# Patient Record
Sex: MALE | Race: WHITE | NOT HISPANIC OR LATINO | URBAN - METROPOLITAN AREA
[De-identification: names, ages, dates, MRNs, and addresses within clinical notes are randomized per-mention and may not be internally consistent; named-entity substitution may affect disease eponyms.]

---

## 2020-10-04 ENCOUNTER — EMERGENCY (EMERGENCY)
Facility: HOSPITAL | Age: 25
LOS: 0 days | Discharge: HOME | End: 2020-10-04
Attending: EMERGENCY MEDICINE | Admitting: EMERGENCY MEDICINE
Payer: MEDICAID

## 2020-10-04 VITALS
RESPIRATION RATE: 18 BRPM | OXYGEN SATURATION: 98 % | HEART RATE: 89 BPM | SYSTOLIC BLOOD PRESSURE: 139 MMHG | DIASTOLIC BLOOD PRESSURE: 99 MMHG | TEMPERATURE: 98 F

## 2020-10-04 DIAGNOSIS — J45.901 UNSPECIFIED ASTHMA WITH (ACUTE) EXACERBATION: ICD-10-CM

## 2020-10-04 PROCEDURE — 99284 EMERGENCY DEPT VISIT MOD MDM: CPT

## 2020-10-04 RX ORDER — ALBUTEROL 90 UG/1
3 AEROSOL, METERED ORAL
Qty: 84 | Refills: 0
Start: 2020-10-04 | End: 2020-10-10

## 2020-10-04 RX ORDER — ALBUTEROL 90 UG/1
1 AEROSOL, METERED ORAL ONCE
Refills: 0 | Status: DISCONTINUED | OUTPATIENT
Start: 2020-10-04 | End: 2020-10-04

## 2020-10-04 RX ORDER — IPRATROPIUM/ALBUTEROL SULFATE 18-103MCG
3 AEROSOL WITH ADAPTER (GRAM) INHALATION ONCE
Refills: 0 | Status: COMPLETED | OUTPATIENT
Start: 2020-10-04 | End: 2020-10-04

## 2020-10-04 RX ADMIN — Medication 3 MILLILITER(S): at 10:00

## 2020-10-04 RX ADMIN — Medication 3 MILLILITER(S): at 10:30

## 2020-10-04 NOTE — ED PROVIDER NOTE - OBJECTIVE STATEMENT
26 y/o male with hx Asthma BIBA c/o "I've been feeling SOB and wheezing for a couple days. I ran out of my inhaler. I got a nebulizer treatment and Solumedrol on the ambulance." no fever/ chills/ recent travel

## 2020-10-04 NOTE — ED PROVIDER NOTE - ATTENDING CONTRIBUTION TO CARE
25 year old male, pmhx asthma, presenting with dyspnea and wheezing x 2-3 days. States he ran out of his inhaler and therefore has not been able to take anything at home. States it feels the exact same as prior asthma exacerbations. Otherwise denies fevers, chest pain, GI symptoms, leg swelling or other complaints.    Vital Signs: I have reviewed the initial vital signs.  Constitutional: NAD, well-nourished, appears stated age, no acute distress.  HEENT: Airway patent, moist MM, no erythema/swelling/deformity of oral structures. EOMI, PERRLA.  CV: regular rate, regular rhythm, well-perfused extremities, 2+ b/l DP and radial pulses equal.  Lungs: (+) mild b/l wheezing, no increased WOB.  ABD: NTND, no guarding or rebound, no pulsatile mass, no hernias.   MSK: Neck supple, nontender, nl ROM, no stepoff. Chest nontender. Back nontender in TLS spine or to b/l bony structures or flanks. Ext nontender, nl rom, no deformity.   INTEG: Skin warm, dry, no rash.  NEURO: A&Ox3, normal strength, nl sensation throughout, normal speech.   PSYCH: Calm, cooperative, normal affect and interaction.    Patient very well appearing. Will give nebs, re-eval. Patient received steroids IV en route.

## 2020-10-04 NOTE — ED PROVIDER NOTE - PATIENT PORTAL LINK FT
You can access the FollowMyHealth Patient Portal offered by [facility] by registering at the following website: [email or web address]. By joining [facility]’s FollowMyHealth portal, you will also be able to view your health information using other applications (apps) compatible with our system.

## 2020-10-04 NOTE — ED PROVIDER NOTE - CLINICAL SUMMARY MEDICAL DECISION MAKING FREE TEXT BOX
Patient presented with wheezing similar to prior asthma exacerbations. Otherwise afebrile, HD stable, speaking full sentences, normal O2 saturation on RA. Given steroids by EMS en route and in ED given nebs with resolution of wheezing. Patient able to ambulate without difficulty or desaturation after tx, tolerates PO. Will replenish patient's prescriptions for asthma and discharge with outpatient follow up. Patient agreeable with plan. Agrees to return to ED for any new or worsening symptoms.

## 2020-10-04 NOTE — ED ADULT TRIAGE NOTE - NS ED NURSE AMBULANCES
Attempted to get EKG, pt became agitated an uncooperative. Security called for assistance will attempt again when they arrive. Nallely

## 2020-10-04 NOTE — ED ADULT NURSE NOTE - OBJECTIVE STATEMENT
25 year old male presents for asthma exacerbation patient received solumedrol in the field, and neb tx

## 2020-11-08 ENCOUNTER — INPATIENT (INPATIENT)
Facility: HOSPITAL | Age: 25
LOS: 2 days | Discharge: HOME | End: 2020-11-11
Attending: INTERNAL MEDICINE | Admitting: INTERNAL MEDICINE
Payer: MEDICAID

## 2020-11-08 VITALS
OXYGEN SATURATION: 94 % | TEMPERATURE: 98 F | HEART RATE: 114 BPM | WEIGHT: 145.06 LBS | SYSTOLIC BLOOD PRESSURE: 131 MMHG | DIASTOLIC BLOOD PRESSURE: 90 MMHG | RESPIRATION RATE: 20 BRPM | HEIGHT: 69 IN

## 2020-11-08 PROBLEM — J45.909 UNSPECIFIED ASTHMA, UNCOMPLICATED: Chronic | Status: ACTIVE | Noted: 2020-10-04

## 2020-11-08 LAB
ALBUMIN SERPL ELPH-MCNC: 4.7 G/DL — SIGNIFICANT CHANGE UP (ref 3.5–5.2)
ALP SERPL-CCNC: 59 U/L — SIGNIFICANT CHANGE UP (ref 30–115)
ALT FLD-CCNC: 17 U/L — SIGNIFICANT CHANGE UP (ref 0–41)
ANION GAP SERPL CALC-SCNC: 13 MMOL/L — SIGNIFICANT CHANGE UP (ref 7–14)
AST SERPL-CCNC: 26 U/L — SIGNIFICANT CHANGE UP (ref 0–41)
BASE EXCESS BLDA CALC-SCNC: -1.3 MMOL/L — SIGNIFICANT CHANGE UP (ref -2–2)
BASE EXCESS BLDV CALC-SCNC: 0.4 MMOL/L — SIGNIFICANT CHANGE UP (ref -2–2)
BASOPHILS # BLD AUTO: 0.06 K/UL — SIGNIFICANT CHANGE UP (ref 0–0.2)
BASOPHILS NFR BLD AUTO: 0.4 % — SIGNIFICANT CHANGE UP (ref 0–1)
BILIRUB SERPL-MCNC: 0.3 MG/DL — SIGNIFICANT CHANGE UP (ref 0.2–1.2)
BUN SERPL-MCNC: 14 MG/DL — SIGNIFICANT CHANGE UP (ref 10–20)
CA-I SERPL-SCNC: 1.23 MMOL/L — SIGNIFICANT CHANGE UP (ref 1.12–1.3)
CALCIUM SERPL-MCNC: 9.3 MG/DL — SIGNIFICANT CHANGE UP (ref 8.5–10.1)
CHLORIDE SERPL-SCNC: 105 MMOL/L — SIGNIFICANT CHANGE UP (ref 98–110)
CO2 SERPL-SCNC: 23 MMOL/L — SIGNIFICANT CHANGE UP (ref 17–32)
CREAT SERPL-MCNC: 0.7 MG/DL — SIGNIFICANT CHANGE UP (ref 0.7–1.5)
EOSINOPHIL # BLD AUTO: 0.04 K/UL — SIGNIFICANT CHANGE UP (ref 0–0.7)
EOSINOPHIL NFR BLD AUTO: 0.3 % — SIGNIFICANT CHANGE UP (ref 0–8)
GAS PNL BLDV: 142 MMOL/L — SIGNIFICANT CHANGE UP (ref 136–145)
GAS PNL BLDV: SIGNIFICANT CHANGE UP
GLUCOSE SERPL-MCNC: 133 MG/DL — HIGH (ref 70–99)
HCO3 BLDA-SCNC: 25 MMOL/L — SIGNIFICANT CHANGE UP (ref 23–27)
HCO3 BLDV-SCNC: 27 MMOL/L — SIGNIFICANT CHANGE UP (ref 22–29)
HCT VFR BLD CALC: 47 % — SIGNIFICANT CHANGE UP (ref 42–52)
HCT VFR BLDA CALC: 49.4 % — HIGH (ref 34–44)
HGB BLD CALC-MCNC: 16.1 G/DL — SIGNIFICANT CHANGE UP (ref 14–18)
HGB BLD-MCNC: 15.3 G/DL — SIGNIFICANT CHANGE UP (ref 14–18)
IMM GRANULOCYTES NFR BLD AUTO: 0.4 % — HIGH (ref 0.1–0.3)
LACTATE BLDV-MCNC: 2.8 MMOL/L — HIGH (ref 0.5–1.6)
LACTATE SERPL-SCNC: 4.8 MMOL/L — CRITICAL HIGH (ref 0.7–2)
LYMPHOCYTES # BLD AUTO: 0.57 K/UL — LOW (ref 1.2–3.4)
LYMPHOCYTES # BLD AUTO: 3.9 % — LOW (ref 20.5–51.1)
MCHC RBC-ENTMCNC: 25.8 PG — LOW (ref 27–31)
MCHC RBC-ENTMCNC: 32.6 G/DL — SIGNIFICANT CHANGE UP (ref 32–37)
MCV RBC AUTO: 79.1 FL — LOW (ref 80–94)
MONOCYTES # BLD AUTO: 0.2 K/UL — SIGNIFICANT CHANGE UP (ref 0.1–0.6)
MONOCYTES NFR BLD AUTO: 1.4 % — LOW (ref 1.7–9.3)
NEUTROPHILS # BLD AUTO: 13.51 K/UL — HIGH (ref 1.4–6.5)
NEUTROPHILS NFR BLD AUTO: 93.6 % — HIGH (ref 42.2–75.2)
NRBC # BLD: 0 /100 WBCS — SIGNIFICANT CHANGE UP (ref 0–0)
PCO2 BLDA: 47 MMHG — HIGH (ref 38–42)
PCO2 BLDV: 48 MMHG — SIGNIFICANT CHANGE UP (ref 41–51)
PH BLDA: 7.33 — LOW (ref 7.38–7.42)
PH BLDV: 7.35 — SIGNIFICANT CHANGE UP (ref 7.26–7.43)
PLATELET # BLD AUTO: 193 K/UL — SIGNIFICANT CHANGE UP (ref 130–400)
PO2 BLDA: 24 MMHG — CRITICAL LOW (ref 78–95)
PO2 BLDV: 24 MMHG — SIGNIFICANT CHANGE UP (ref 20–40)
POTASSIUM BLDV-SCNC: 4 MMOL/L — SIGNIFICANT CHANGE UP (ref 3.3–5.6)
POTASSIUM SERPL-MCNC: 4 MMOL/L — SIGNIFICANT CHANGE UP (ref 3.5–5)
POTASSIUM SERPL-SCNC: 4 MMOL/L — SIGNIFICANT CHANGE UP (ref 3.5–5)
PROT SERPL-MCNC: 7.2 G/DL — SIGNIFICANT CHANGE UP (ref 6–8)
RBC # BLD: 5.94 M/UL — SIGNIFICANT CHANGE UP (ref 4.7–6.1)
RBC # FLD: 14 % — SIGNIFICANT CHANGE UP (ref 11.5–14.5)
SAO2 % BLDA: 48 % — CRITICAL LOW (ref 94–98)
SAO2 % BLDV: 48 % — SIGNIFICANT CHANGE UP
SODIUM SERPL-SCNC: 141 MMOL/L — SIGNIFICANT CHANGE UP (ref 135–146)
WBC # BLD: 14.44 K/UL — HIGH (ref 4.8–10.8)
WBC # FLD AUTO: 14.44 K/UL — HIGH (ref 4.8–10.8)

## 2020-11-08 PROCEDURE — 99285 EMERGENCY DEPT VISIT HI MDM: CPT

## 2020-11-08 PROCEDURE — 71045 X-RAY EXAM CHEST 1 VIEW: CPT | Mod: 26

## 2020-11-08 RX ORDER — ENOXAPARIN SODIUM 100 MG/ML
40 INJECTION SUBCUTANEOUS AT BEDTIME
Refills: 0 | Status: DISCONTINUED | OUTPATIENT
Start: 2020-11-08 | End: 2020-11-11

## 2020-11-08 RX ORDER — ALBUTEROL 90 UG/1
1 AEROSOL, METERED ORAL EVERY 4 HOURS
Refills: 0 | Status: DISCONTINUED | OUTPATIENT
Start: 2020-11-08 | End: 2020-11-11

## 2020-11-08 RX ORDER — SODIUM CHLORIDE 9 MG/ML
1000 INJECTION, SOLUTION INTRAVENOUS
Refills: 0 | Status: DISCONTINUED | OUTPATIENT
Start: 2020-11-08 | End: 2020-11-08

## 2020-11-08 RX ORDER — PANTOPRAZOLE SODIUM 20 MG/1
40 TABLET, DELAYED RELEASE ORAL
Refills: 0 | Status: DISCONTINUED | OUTPATIENT
Start: 2020-11-08 | End: 2020-11-11

## 2020-11-08 RX ORDER — CHLORHEXIDINE GLUCONATE 213 G/1000ML
1 SOLUTION TOPICAL
Refills: 0 | Status: DISCONTINUED | OUTPATIENT
Start: 2020-11-08 | End: 2020-11-11

## 2020-11-08 RX ORDER — IPRATROPIUM/ALBUTEROL SULFATE 18-103MCG
3 AEROSOL WITH ADAPTER (GRAM) INHALATION
Refills: 0 | Status: COMPLETED | OUTPATIENT
Start: 2020-11-08 | End: 2020-11-08

## 2020-11-08 RX ORDER — MAGNESIUM SULFATE 500 MG/ML
2 VIAL (ML) INJECTION ONCE
Refills: 0 | Status: COMPLETED | OUTPATIENT
Start: 2020-11-08 | End: 2020-11-08

## 2020-11-08 RX ORDER — SODIUM CHLORIDE 9 MG/ML
2000 INJECTION, SOLUTION INTRAVENOUS ONCE
Refills: 0 | Status: COMPLETED | OUTPATIENT
Start: 2020-11-08 | End: 2020-11-08

## 2020-11-08 RX ORDER — ACETAMINOPHEN 500 MG
650 TABLET ORAL EVERY 6 HOURS
Refills: 0 | Status: DISCONTINUED | OUTPATIENT
Start: 2020-11-08 | End: 2020-11-11

## 2020-11-08 RX ORDER — IPRATROPIUM/ALBUTEROL SULFATE 18-103MCG
3 AEROSOL WITH ADAPTER (GRAM) INHALATION
Refills: 0 | Status: DISCONTINUED | OUTPATIENT
Start: 2020-11-08 | End: 2020-11-09

## 2020-11-08 RX ORDER — IPRATROPIUM/ALBUTEROL SULFATE 18-103MCG
3 AEROSOL WITH ADAPTER (GRAM) INHALATION EVERY 4 HOURS
Refills: 0 | Status: DISCONTINUED | OUTPATIENT
Start: 2020-11-08 | End: 2020-11-08

## 2020-11-08 RX ADMIN — Medication 3 MILLILITER(S): at 14:50

## 2020-11-08 RX ADMIN — Medication 3 MILLILITER(S): at 14:00

## 2020-11-08 RX ADMIN — Medication 3 MILLILITER(S): at 16:00

## 2020-11-08 RX ADMIN — Medication 650 MILLIGRAM(S): at 15:43

## 2020-11-08 RX ADMIN — Medication 3 MILLILITER(S): at 22:29

## 2020-11-08 RX ADMIN — Medication 3 MILLILITER(S): at 02:24

## 2020-11-08 RX ADMIN — Medication 3 MILLILITER(S): at 13:00

## 2020-11-08 RX ADMIN — Medication 3 MILLILITER(S): at 21:00

## 2020-11-08 RX ADMIN — ENOXAPARIN SODIUM 40 MILLIGRAM(S): 100 INJECTION SUBCUTANEOUS at 21:21

## 2020-11-08 RX ADMIN — SODIUM CHLORIDE 2000 MILLILITER(S): 9 INJECTION, SOLUTION INTRAVENOUS at 06:05

## 2020-11-08 RX ADMIN — Medication 3 MILLILITER(S): at 03:11

## 2020-11-08 RX ADMIN — Medication 3 MILLILITER(S): at 20:00

## 2020-11-08 RX ADMIN — Medication 3 MILLILITER(S): at 05:40

## 2020-11-08 RX ADMIN — Medication 3 MILLILITER(S): at 06:05

## 2020-11-08 RX ADMIN — SODIUM CHLORIDE 80 MILLILITER(S): 9 INJECTION, SOLUTION INTRAVENOUS at 12:25

## 2020-11-08 RX ADMIN — Medication 3 MILLILITER(S): at 17:00

## 2020-11-08 RX ADMIN — Medication 3 MILLILITER(S): at 04:48

## 2020-11-08 RX ADMIN — Medication 3 MILLILITER(S): at 23:40

## 2020-11-08 RX ADMIN — Medication 80 MILLIGRAM(S): at 18:06

## 2020-11-08 RX ADMIN — Medication 50 GRAM(S): at 04:48

## 2020-11-08 RX ADMIN — Medication 3 MILLILITER(S): at 18:00

## 2020-11-08 RX ADMIN — Medication 3 MILLILITER(S): at 19:00

## 2020-11-08 NOTE — ED PROVIDER NOTE - CLINICAL SUMMARY MEDICAL DECISION MAKING FREE TEXT BOX
patient presented to ED for sob/wheezing, improved with nebs and steroids, but continued with sob/wheezing, results of the labs, imaging findings reviewed and discussed with patient, discussed with admitting physician and MAR, patient is admitted to Medicine for further evaluation and care.

## 2020-11-08 NOTE — PATIENT PROFILE ADULT - HARM RISK FACTORS
Patient Education        Childhood Depression: Care Instructions  Your Care Instructions  Depression is a mood disorder that causes a child or teen to feel sad or irritable for a long period of time. A young person who is depressed may not enjoy school, play, or friends. He or she may also sleep more or less than usual, lose or gain weight, and be withdrawn. Depression may run in families. It is linked to a chemical problem in the brain. The chemical problem can be caused by medicines, illness, or stress. Events that cause great stress, such as moving or the loss of a loved one, can trigger it. Depression can last for a long time. It may come in cycles of feeling down and feeling normal. It is important to know that all forms of depression can be treated. Follow-up care is a key part of your child's treatment and safety. Be sure to make and go to all appointments, and call your doctor if your child is having problems. It's also a good idea to know your child's test results and keep a list of the medicines your child takes. How can you care for your child at home? · Offer your child support and understanding. This is one of the most important things you can do to help your child cope with being depressed. · Be safe with medicines. Have your child take medicines exactly as prescribed. Call your doctor if your child has any problems with his or her medicine. It is important for your child to keep taking medicine for depression even after symptoms go away, so that it does not come back. Your child may need to try several medicines before finding the one that works best. Many side effects of the medicines go away after a while. Talk to your doctor about any side effects or other concerns. · Make sure your child gets enough sleep. There are things you can do if he or she has problems sleeping. ? Have your child go to bed at the same time every night and get up at the same time every morning. ?  Keep his or her bedroom dark and free of noise. ? Do not let your child drink anything with caffeine after 12:00 noon. ? Do not give your child over-the-counter sleeping pills. They can make his or her sleep restless. They may also interact with depression medicine. · Make sure your child gets regular exercise, such as swimming, walking, or playing vigorously every day. · Avoid over-the-counter medicines, herbal therapies, and any medicines that have not been prescribed by your doctor. They may interfere with the medicine used to treat depression. · Feed your child healthy foods. If your child does not want to eat, it may help to encourage him or her to eat small, frequent snacks rather than 1 or 2 large meals each day. · Encourage your child to be hopeful about feeling better. Positive thinking is very important in treating depression. It is hard to be hopeful when you feel depressed, but remind your child that recovery happens over time. · Find a counselor your child likes and trusts. Encourage your child to talk openly and honestly about his or her problems. · Keep the numbers for these national suicide hotlines: 2-428-034-TALK (2-328.721.8838) and 8-191-CKHCCDS (5-739.475.9282). When should you call for help? Call 911 anytime you think your child may need emergency care. For example, call if:    · Your child makes threats or attempts to hurt himself or herself or another person.     · You are a young person and you feel you cannot stop from hurting yourself or someone else.   [facility] your doctor now or seek immediate medical care if:    · Your child hears voices.     · Your child has depression and:  ? Starts to give away his or her possessions. ? Uses illegal drugs or drinks alcohol heavily. ? Talks or writes about death, including writing suicide notes and talking about guns, knives, or pills. Be sure all guns, knives, and pills are safely put away where your child cannot get to them.   ? Starts to spend a lot of time alone. ? Acts very aggressively or suddenly appears calm.    Watch closely for changes in your child's health, and be sure to contact your doctor if your child has any problems. Where can you learn more? Go to https://Qbox.ioangella.TouchFrame. org and sign in to your [employer] account. Enter T122 in the [facility] box to learn more about \"Childhood Depression: Care Instructions. \"     If you do not have an account, please click on the \"Sign Up Now\" link. Current as of: May 28, 2019  Content Version: 12.3  © 0944-3053 Healthwise, Incorporated. Care instructions adapted under license by [facility] ([facility]). If you have questions about a medical condition or this instruction, always ask your healthcare professional. [address] any warranty or liability for your use of this information. yes

## 2020-11-08 NOTE — CONSULT NOTE ADULT - ASSESSMENT
IMPRESSION:  Severe Asthma exacerbation  Acute hypercapnic respiratory failure    PLAN:    CNS: avoid sedative    HEENT: Oral care    PULMONARY:  HOB @ 45 degrees.  Aspiration precautions . Magnesium 2g IV x1. BiPAP 14/8 PRN. Solumedrol 60mg q8h. Nebs q4h ATC    CARDIOVASCULAR: Goal MAP >65. Keep I=O    GI: GI prophylaxis.  Feeding.  Bowel regimen     RENAL:  Follow up lytes.  Correct as needed    INFECTIOUS DISEASE: Follow up cultures. f/u covid PCR    HEMATOLOGICAL:  DVT prophylaxis.    ENDOCRINE:  Follow up FS.  Insulin protocol if needed.    MUSCULOSKELETAL: OOBTC    Dispo: MICU

## 2020-11-08 NOTE — PATIENT PROFILE ADULT - NSASFALLATTEMPTOOB_GEN_A_NUR
no I have evaluated the patient in conjunction with the resident and agree with the above history, physical, assessment, and plan

## 2020-11-08 NOTE — ED PROVIDER NOTE - NS ED ROS FT
Review of Systems:  	•	CONSTITUTIONAL - no fever, no diaphoresis  	•	SKIN - no rash, no lesions  	•	HEMATOLOGIC - no bleeding, no bruising  	•	EYES - no discharge, no injection  	•	ENT - no sore throat, no runny nose  	•	RESPIRATORY - +shortness of breath, no cough  	•	CARDIAC - no chest pain, no palpitations  	•	GI - no abd pain, no nausea, no vomiting, no diarrhea  	•	GENITO-URINARY - no dysuria, no hematuria  	•	MUSCULOSKELETAL - no joint pain, no muscle aches  	•	NEUROLOGIC - no dizziness, no headache

## 2020-11-08 NOTE — ED PROVIDER NOTE - ATTENDING CONTRIBUTION TO CARE
Patient is c/o sob/wheezing, h/o asthma, Patient states that, had been using home inhalers, but did not help, sob continued, so was brought to ED for evaluation. Denies trauma.   Vitals noted  Patient is awake, alert, speaking in full sentences, appears well and not in distress.   Lungs: B/L wheezing, no crackles  abd: +BS, NT, ND, soft  A/P: Asthma exacerbation  nebs, steroids, CXR  reevaluation.

## 2020-11-08 NOTE — H&P ADULT - HISTORY OF PRESENT ILLNESS
25M w/ PMHx of Asthma presents w/ SOB and Wheezing started yesterday morning. Pt reports he woke up with feeling of chest-tightness, SOB and Wheezing. He used albuterol nebulizer throughout the day yesterday. Despite this his symptoms got worse with worsening chest-tightness and SOB so he decided to come to the hospital. He reports he had URTI recently on last Wednesday. He was unable to make an appointment to see his pulmonologist due to COVID. No past hospitalizations for Asthma exacerbations. Denies sick contacts. Admits to SOB/Wheezing and Chest tightness. Denies HA, fatigue, fever, chills, dizziness, change in vision, runny nose, sore throat, CP,cough, abd pain, nausea, vomiting, diarrhea, constipation, blood in stool or urine, and dysuria.      ED vitals: /90, , RR 20, T 97.8f, SPO2 94% on RA  EMS administered 125mg solumedrol, 1x duoneb  ED: received 2L LR, Mg infusion and duonebsx2

## 2020-11-08 NOTE — ED PROVIDER NOTE - PROGRESS NOTE DETAILS
AG: pt w/ persistent wheezing s/p 2 additional duonebs, additional duonebs + mg ordered administered. Patient lactate noted, fluid ordered.

## 2020-11-08 NOTE — H&P ADULT - NSHPLABSRESULTS_GEN_ALL_CORE
15.3   14.44 )-----------( 193      ( 08 Nov 2020 04:02 )             47.0       11-08    141  |  105  |  14  ----------------------------<  133<H>  4.0   |  23  |  0.7    Ca    9.3      08 Nov 2020 04:02    TPro  7.2  /  Alb  4.7  /  TBili  0.3  /  DBili  x   /  AST  26  /  ALT  17  /  AlkPhos  59  11-08

## 2020-11-08 NOTE — ED PROVIDER NOTE - PHYSICAL EXAMINATION
Vital Signs: Reviewed  GEN: alert, NAD, speaks full sentences  HEAD:  normocephalic, atraumatic  EYES:  PERRLA; conjunctivae without injection, drainage or discharge  ENMT:  nasal mucosa moist; mouth moist without ulcerations or lesions; throat moist without erythema, exudate, ulcerations or lesions  NECK:  supple  CARDIAC:  regular rate, normal S1 and S2, no murmurs  RESP:  respiratory rate and effort appear normal for age; diffuse wheezing b/l  ABDOMEN:  soft, nontender, nondistended  MUSCULOSKELETAL/NEURO:  normal movement, normal tone  SKIN:  normal skin color for age and race, well-perfused; warm and dry

## 2020-11-08 NOTE — H&P ADULT - NSHPPHYSICALEXAM_GEN_ALL_CORE
GENERAL: NAD, Resting in bed  HEENT: NCAT  CHEST/LUNG: expiratory wheezing b/l   HEART: Regular rate and rhythm; No murmurs, rubs, or gallops  ABDOMEN: Bowel sounds present; Soft, Nontender, Nondistended.   EXTREMITIES:  No clubbing, cyanosis, or edema  NERVOUS SYSTEM:  Alert & Oriented X3  MSK: FROM all 4 extremities, full and equal strength  SKIN: No rashes or lesions

## 2020-11-08 NOTE — ED PROVIDER NOTE - OBJECTIVE STATEMENT
25yM pmhx asthma BIBEMS c/o SOB and wheezing x1 day; waxing and waning, worsening, initially was alleviated by albuterol but shortly PTA was not responding to albuterol; states he has used his inhaler approx 1-2x/wk for the past month, has a pulmonologist in [place] but has been unable to schedule an appointment since covid began. Pt denies fever/chills, congestion, chest pain, n/v/d, leg pain.  EMS administered 125mg solumedrol, 1x duoneb.

## 2020-11-08 NOTE — CONSULT NOTE ADULT - SUBJECTIVE AND OBJECTIVE BOX
Patient is a 25y old  Male who presents with a chief complaint of SOB/Wheezing (08 Nov 2020 08:56)      HPI:  25M w/ PMHx of Asthma presents w/ SOB and Wheezing started yesterday morning. Pt reports he woke up with feeling of chest-tightness, SOB and Wheezing. He used albuterol nebulizer throughout the day yesterday. Despite this his symptoms got worse with worsening chest-tightness and SOB so he decided to come to the hospital. He reports he had URTI recently on last Wednesday. He was unable to make an appointment to see his pulmonologist due to COVID. No past hospitalizations for Asthma exacerbations. Denies sick contacts. Admits to SOB/Wheezing and Chest tightness. Denies HA, fatigue, fever, chills, dizziness, change in vision, runny nose, sore throat, CP,cough, abd pain, nausea, vomiting, diarrhea, constipation, blood in stool or urine, and dysuria.      ED vitals: /90, , RR 20, T 97.8f, SPO2 94% on RA  EMS administered 125mg solumedrol, 1x duoneb  ED: received 2L LR, Mg infusion and duonebsx2 (08 Nov 2020 08:56)      PAST MEDICAL & SURGICAL HISTORY:  Asthma    No significant past surgical history        FAMILY HISTORY:  No pertinent family history in first degree relatives    :  No known cardiovacular family hisotry     Review Of Systems:     All ROS are negative except per HPI       Allergies    No Known Allergies    Intolerances          PHYSICAL EXAM    ICU Vital Signs Last 24 Hrs  T(C): 37.3 (08 Nov 2020 15:19), Max: 37.3 (08 Nov 2020 15:19)  T(F): 99.2 (08 Nov 2020 15:19), Max: 99.2 (08 Nov 2020 15:19)  HR: 116 (08 Nov 2020 15:19) (94 - 116)  BP: 130/86 (08 Nov 2020 15:19) (103/70 - 131/90)  BP(mean): --  ABP: --  ABP(mean): --  RR: 20 (08 Nov 2020 15:19) (19 - 25)  SpO2: 98% (08 Nov 2020 15:19) (94% - 98%)      CONSTITUTIONAL:  Well nourished.   NAD    ENT:   Airway patent,   Mouth with normal mucosa.   No thrush      CARDIAC:   Normal rate,   Regular rhythm.    No edema      Vascular:   normal systolic impulse  no bruits    RESPIRATORY:   No wheezing  Bilateral BS   Not tachypneic,  No use of accessory muscles    GASTROINTESTINAL:  Abdomen soft,   Non-tender,   No guarding,   + BS      NEUROLOGICAL:   Alert and oriented   No motor deficits.    SKIN:   Skin normal color for race,   No evidence of rash.      HEME LYMPH: .  No cervical  lymphadenopathy.  No inguinal lymphadenopathy              LABS:                          15.3   14.44 )-----------( 193      ( 08 Nov 2020 04:02 )             47.0                                               11-08    141  |  105  |  14  ----------------------------<  133<H>  4.0   |  23  |  0.7    Ca    9.3      08 Nov 2020 04:02    TPro  7.2  /  Alb  4.7  /  TBili  0.3  /  DBili  x   /  AST  26  /  ALT  17  /  AlkPhos  59  11-08                                                                                           LIVER FUNCTIONS - ( 08 Nov 2020 04:02 )  Alb: 4.7 g/dL / Pro: 7.2 g/dL / ALK PHOS: 59 U/L / ALT: 17 U/L / AST: 26 U/L / GGT: x                                                                                                                                   ABG - ( 08 Nov 2020 10:06 )  pH, Arterial: 7.33  pH, Blood: x     /  pCO2: 47    /  pO2: 24    / HCO3: 25    / Base Excess: -1.3  /  SaO2: 48                  X-Rays reviewed                                                                                     ECHO    CXR interpreted by me hyperinflated lungs, no acute opacity    MEDICATIONS  (STANDING):  albuterol/ipratropium for Nebulization 3 milliLiter(s) Nebulizer every 1 hour  chlorhexidine 4% Liquid 1 Application(s) Topical <User Schedule>  enoxaparin Injectable 40 milliGRAM(s) SubCutaneous at bedtime  lactated ringers. 1000 milliLiter(s) (80 mL/Hr) IV Continuous <Continuous>  methylPREDNISolone sodium succinate Injectable 80 milliGRAM(s) IV Push every 12 hours  pantoprazole    Tablet 40 milliGRAM(s) Oral before breakfast    MEDICATIONS  (PRN):  acetaminophen   Tablet .. 650 milliGRAM(s) Oral every 6 hours PRN Mild Pain (1 - 3)  ALBUTerol    90 MICROgram(s) HFA Inhaler 1 Puff(s) Inhalation every 4 hours PRN Shortness of Breath and/or Wheezing

## 2020-11-08 NOTE — CHART NOTE - NSCHARTNOTEFT_GEN_A_CORE
25M w/ PMHx of Asthma presents w/ SOB and Wheezing started yesterday morning. Pt reports he woke up with feeling of chest-tightness, SOB and Wheezing. He used albuterol nebulizer throughout the day yesterday. Despite this his symptoms got worse with worsening chest-tightness and SOB so he decided to come to the hospital. He reports he had URTI recently on last Wednesday. He was unable to make an appointment to see his pulmonologist due to COVID. No past hospitalizations for Asthma exacerbations. Denies sick contacts. Admits to SOB/Wheezing and Chest tightness. Denies HA, fatigue, fever, chills, dizziness, change in vision, runny nose, sore throat, CP,cough, abd pain, nausea, vomiting, diarrhea, constipation, blood in stool or urine, and dysuria.      ED vitals: /90, , RR 20, T 97.8f, SPO2 94% on RA  EMS administered 125mg solumedrol, 1x duoneb  ED: received 2L LR, Mg infusion and duonebsx2      Repeat VBG: pH, Arterial: 7.33  pH, Blood: x     /  pCO2: 47    /  pO2: 24    / HCO3: 25    / Base Excess: -1.3  /  SaO2: 48        Pt noted to be tachypneic with use of accessory muscles.  Spoke with Dr. Christopher and Dr. Rivera.     Plan:   Continuous Bipap with continuous Duonebs,  Solumedrol 80mg BID  MICU monitoring  F/u Repeat ABG in 4 hours. 25M w/ PMHx of Asthma presents w/ SOB and Wheezing started yesterday morning. Pt reports he woke up with feeling of chest-tightness, SOB and Wheezing. He used albuterol nebulizer throughout the day yesterday. Despite this his symptoms got worse with worsening chest-tightness and SOB so he decided to come to the hospital. He reports he had URTI recently on last Wednesday. He was unable to make an appointment to see his pulmonologist due to COVID. No past hospitalizations for Asthma exacerbations. Denies sick contacts. Admits to SOB/Wheezing and Chest tightness. Denies HA, fatigue, fever, chills, dizziness, change in vision, runny nose, sore throat, CP,cough, abd pain, nausea, vomiting, diarrhea, constipation, blood in stool or urine, and dysuria.      ED vitals: /90, , RR 20, T 97.8f, SPO2 94% on RA  EMS administered 125mg solumedrol, 1x duoneb  ED: received 2L LR, Mg infusion and duonebsx2      Repeat VBG: pH, Arterial: 7.33  pH, Blood: x     /  pCO2: 47    /  pO2: 24    / HCO3: 25    / Base Excess: -1.3  /  SaO2: 48        Pt noted to be tachypneic with use of accessory muscles and still wheezing.  Spoke with Dr. Christopher and Dr. Rivera.     Plan:   Continuous Bipap with continuous Duonebs,  Solumedrol 80mg BID  MICU monitoring  F/u Repeat ABG in 4 hours. 25M w/ PMHx of Asthma presents w/ SOB and Wheezing started yesterday morning. Pt reports he woke up with feeling of chest-tightness, SOB and Wheezing. He used albuterol nebulizer throughout the day yesterday. Despite this his symptoms got worse with worsening chest-tightness and SOB so he decided to come to the hospital. He reports he had URTI recently on last Wednesday. He was unable to make an appointment to see his pulmonologist due to COVID. No past hospitalizations for Asthma exacerbations. Denies sick contacts. Admits to SOB/Wheezing and Chest tightness. Denies HA, fatigue, fever, chills, dizziness, change in vision, runny nose, sore throat, CP,cough, abd pain, nausea, vomiting, diarrhea, constipation, blood in stool or urine, and dysuria.      ED vitals: /90, , RR 20, T 97.8f, SPO2 94% on RA  EMS administered 125mg solumedrol, 1x duoneb  ED: received 2L LR, Mg infusion and duonebsx2      Repeat VBG: pH, Arterial: 7.33  pH, Blood: x     /  pCO2: 47    /  pO2: 24    / HCO3: 25    / Base Excess: -1.3  /  SaO2: 48        Pt noted to be tachypneic with use of accessory muscles and still wheezing.  Spoke with Dr. Christopher and Dr. Rivera.     # Hypercapnic respiratory failure due to asthma exacerbation  # Drug induced Lactic acidosis - likely from albuterol and steroids     Plan:   Continuous Bipap with continuous albuterol; however covid-19 test hasn't resulted and not negative pressure room so continuous albuterol for now.   Solumedrol 80mg BID  MICU monitoring  F/u Repeat ABG in 1 hours.  IVF  f/u blood cx and serum lactate

## 2020-11-08 NOTE — ED ADULT NURSE NOTE - OBJECTIVE STATEMENT
Pt is BIBA from home with asthma attach. Pt has hx os asthma, was given solumedrol and nebulizer in the ambulance. Pt is alert and orientesx4, denies pains, said he still feels some wheezing, sob, no fever.

## 2020-11-08 NOTE — H&P ADULT - ASSESSMENT
25M w/ PMHx of Asthma presents w/ SOB and Wheezing started yesterday morning.    # Acute Asthma exacerbation 2/2 recent URTI  - b/l expiratory wheezing on physical exam  - s/p Mg 2g IV  - Cont duonebs q4h standing, solumedrol 80mg q12h  - f/u repeat ABG  - CXR done - official read pending.       # DVT ppx - lovenox  # GI ppx - Pantoprazole  # diet - regular  Full code 25M w/ PMHx of Asthma presents w/ SOB and Wheezing started yesterday morning.    # Acute Asthma exacerbation 2/2 recent URTI  - no previous hospitalizations due to Asthma exacerbation  - b/l expiratory wheezing on physical exam  - s/p Mg 2g IV  - Cont duonebs q4h standing, solumedrol 80mg q12h  - f/u repeat ABG  - CXR done - official read pending.       # DVT ppx - lovenox  # GI ppx - Pantoprazole  # diet - regular  Full code 25M w/ PMHx of Asthma presents w/ SOB and Wheezing started yesterday morning.    # Acute Asthma exacerbation 2/2 recent URTI  - no previous hospitalizations due to Asthma exacerbation  - b/l expiratory wheezing on physical exam  - s/p Mg 2g IV  - Cont duonebs q4h standing, solumedrol 80mg q12h  - f/u repeat ABG  - CXR done - official read pending.   - needs o/p pulmonary f/u; has likely mild persistent asthma (sx or inhaler use >2/week)      # DVT ppx - lovenox  # GI ppx - Pantoprazole  # diet - regular  Full code

## 2020-11-09 LAB
ANION GAP SERPL CALC-SCNC: 17 MMOL/L — HIGH (ref 7–14)
BASOPHILS # BLD AUTO: 0.01 K/UL — SIGNIFICANT CHANGE UP (ref 0–0.2)
BASOPHILS NFR BLD AUTO: 0.1 % — SIGNIFICANT CHANGE UP (ref 0–1)
BUN SERPL-MCNC: 13 MG/DL — SIGNIFICANT CHANGE UP (ref 10–20)
CALCIUM SERPL-MCNC: 9.7 MG/DL — SIGNIFICANT CHANGE UP (ref 8.5–10.1)
CHLORIDE SERPL-SCNC: 105 MMOL/L — SIGNIFICANT CHANGE UP (ref 98–110)
CO2 SERPL-SCNC: 20 MMOL/L — SIGNIFICANT CHANGE UP (ref 17–32)
CREAT SERPL-MCNC: 0.7 MG/DL — SIGNIFICANT CHANGE UP (ref 0.7–1.5)
EOSINOPHIL # BLD AUTO: 0 K/UL — SIGNIFICANT CHANGE UP (ref 0–0.7)
EOSINOPHIL NFR BLD AUTO: 0 % — SIGNIFICANT CHANGE UP (ref 0–8)
GLUCOSE SERPL-MCNC: 137 MG/DL — HIGH (ref 70–99)
HCT VFR BLD CALC: 45.6 % — SIGNIFICANT CHANGE UP (ref 42–52)
HGB BLD-MCNC: 14.7 G/DL — SIGNIFICANT CHANGE UP (ref 14–18)
IMM GRANULOCYTES NFR BLD AUTO: 0.4 % — HIGH (ref 0.1–0.3)
LYMPHOCYTES # BLD AUTO: 0.79 K/UL — LOW (ref 1.2–3.4)
LYMPHOCYTES # BLD AUTO: 9.4 % — LOW (ref 20.5–51.1)
MAGNESIUM SERPL-MCNC: 1.7 MG/DL — LOW (ref 1.8–2.4)
MAGNESIUM SERPL-MCNC: 1.9 MG/DL — SIGNIFICANT CHANGE UP (ref 1.8–2.4)
MCHC RBC-ENTMCNC: 26.2 PG — LOW (ref 27–31)
MCHC RBC-ENTMCNC: 32.2 G/DL — SIGNIFICANT CHANGE UP (ref 32–37)
MCV RBC AUTO: 81.3 FL — SIGNIFICANT CHANGE UP (ref 80–94)
MONOCYTES # BLD AUTO: 0.51 K/UL — SIGNIFICANT CHANGE UP (ref 0.1–0.6)
MONOCYTES NFR BLD AUTO: 6.1 % — SIGNIFICANT CHANGE UP (ref 1.7–9.3)
NEUTROPHILS # BLD AUTO: 7.03 K/UL — HIGH (ref 1.4–6.5)
NEUTROPHILS NFR BLD AUTO: 84 % — HIGH (ref 42.2–75.2)
NRBC # BLD: 0 /100 WBCS — SIGNIFICANT CHANGE UP (ref 0–0)
PLATELET # BLD AUTO: 194 K/UL — SIGNIFICANT CHANGE UP (ref 130–400)
POTASSIUM SERPL-MCNC: 4.5 MMOL/L — SIGNIFICANT CHANGE UP (ref 3.5–5)
POTASSIUM SERPL-SCNC: 4.5 MMOL/L — SIGNIFICANT CHANGE UP (ref 3.5–5)
RAPID RVP RESULT: DETECTED
RBC # BLD: 5.61 M/UL — SIGNIFICANT CHANGE UP (ref 4.7–6.1)
RBC # FLD: 14.5 % — SIGNIFICANT CHANGE UP (ref 11.5–14.5)
RV+EV RNA SPEC QL NAA+PROBE: DETECTED
SARS-COV-2 RNA SPEC QL NAA+PROBE: SIGNIFICANT CHANGE UP
SARS-COV-2 RNA SPEC QL NAA+PROBE: SIGNIFICANT CHANGE UP
SODIUM SERPL-SCNC: 142 MMOL/L — SIGNIFICANT CHANGE UP (ref 135–146)
WBC # BLD: 8.37 K/UL — SIGNIFICANT CHANGE UP (ref 4.8–10.8)
WBC # FLD AUTO: 8.37 K/UL — SIGNIFICANT CHANGE UP (ref 4.8–10.8)

## 2020-11-09 RX ORDER — IPRATROPIUM/ALBUTEROL SULFATE 18-103MCG
3 AEROSOL WITH ADAPTER (GRAM) INHALATION EVERY 4 HOURS
Refills: 0 | Status: DISCONTINUED | OUTPATIENT
Start: 2020-11-09 | End: 2020-11-10

## 2020-11-09 RX ORDER — MAGNESIUM SULFATE 500 MG/ML
2 VIAL (ML) INJECTION ONCE
Refills: 0 | Status: COMPLETED | OUTPATIENT
Start: 2020-11-09 | End: 2020-11-09

## 2020-11-09 RX ADMIN — Medication 60 MILLIGRAM(S): at 13:29

## 2020-11-09 RX ADMIN — Medication 80 MILLIGRAM(S): at 06:03

## 2020-11-09 RX ADMIN — PANTOPRAZOLE SODIUM 40 MILLIGRAM(S): 20 TABLET, DELAYED RELEASE ORAL at 06:03

## 2020-11-09 RX ADMIN — Medication 3 MILLILITER(S): at 04:46

## 2020-11-09 RX ADMIN — Medication 3 MILLILITER(S): at 06:56

## 2020-11-09 RX ADMIN — ENOXAPARIN SODIUM 40 MILLIGRAM(S): 100 INJECTION SUBCUTANEOUS at 21:37

## 2020-11-09 RX ADMIN — Medication 3 MILLILITER(S): at 20:43

## 2020-11-09 RX ADMIN — Medication 3 MILLILITER(S): at 16:05

## 2020-11-09 RX ADMIN — Medication 3 MILLILITER(S): at 03:29

## 2020-11-09 RX ADMIN — Medication 50 GRAM(S): at 09:06

## 2020-11-09 RX ADMIN — Medication 3 MILLILITER(S): at 01:48

## 2020-11-09 RX ADMIN — Medication 3 MILLILITER(S): at 11:08

## 2020-11-09 RX ADMIN — Medication 60 MILLIGRAM(S): at 21:36

## 2020-11-09 RX ADMIN — Medication 3 MILLILITER(S): at 00:38

## 2020-11-09 RX ADMIN — Medication 3 MILLILITER(S): at 06:08

## 2020-11-09 RX ADMIN — Medication 3 MILLILITER(S): at 02:39

## 2020-11-09 RX ADMIN — Medication 3 MILLILITER(S): at 07:45

## 2020-11-09 NOTE — CHART NOTE - NSCHARTNOTEFT_GEN_A_CORE
MICU Transfer Note    Transfer from: MICU  Transfer to:  ( x ) Medicine/Stepdown   (  ) Telemetry    (  ) RCU    (  ) Palliative    (  ) Stroke Unit    (  ) _______________  Accepting physician: Dr. Greene      MICU COURSE:    25M w/ PMHx of Asthma presents w/ SOB and Wheezing started yesterday morning. Pt reports he woke up with feeling of chest-tightness, SOB and Wheezing. He used albuterol nebulizer throughout the day yesterday. Despite this his symptoms got worse with worsening chest-tightness and SOB so he decided to come to the hospital. He reports he had URTI recently on last Wednesday. He was unable to make an appointment to see his pulmonologist due to COVID. No past hospitalizations for Asthma exacerbations. Denies sick contacts. Admits to SOB/Wheezing and Chest tightness. Denies HA, fatigue, fever, chills, dizziness, change in vision, runny nose, sore throat, CP,cough, abd pain, nausea, vomiting, diarrhea, constipation, blood in stool or urine, and dysuria.      Patient was clinically improved on IV steroids and DuoNeb treatment. Hypercapnia resolved and patient felt shortness of breath was better. He was medically stable for downgrade to stepdown or medical floor.    ASSESSMENT & PLAN:     25M w/ PMHx of Asthma presents w/ SOB and wheezing starting the day before admission, admitted to ICU for severe asthma exacerbation.    # Acute asthma exacerbation 2/2 recent URTI  - No previous hospitalizations or intubations for asthma  - B/l expiratory wheezing on physical exam  - Keep Mg > 2  - C/w Duoneb q4h standing, solumedrol 80mg q12h  - ABG improving  - On supplemental O2 via [place]  - Needs o/p pulmonary f/u for long-term asthma management  - RVP 11/9 positive for rhino/enterovirus; airborne/droplet precautions    # Tachycardia  - Sinus, likely 2/2 nebulizers  - Monitor VS    DVT PPx: Lovenox 40 mg s/c  GI PPX: Protonix 40 mg PO  Diet: Regular Kosher  Activity: Increase as tolerated  Dispo: from home  Code Status: full code     Pending: Clinical improvement, tapering steroids, can eventually be D/c with close outpatient followup with pulmonology      For Follow-Up:    [] 4 pm magnesium, replete as needed      Vital Signs Last 24 Hrs  T(C): 36.2 (09 Nov 2020 08:00), Max: 36.6 (08 Nov 2020 22:20)  T(F): 97.2 (09 Nov 2020 08:00), Max: 97.8 (08 Nov 2020 22:20)  HR: 112 (09 Nov 2020 14:00) (86 - 120)  BP: 127/64 (09 Nov 2020 14:00) (98/63 - 127/64)  BP(mean): 79 (09 Nov 2020 14:00) (75 - 96)  RR: 28 (09 Nov 2020 14:00) (17 - 29)  SpO2: 93% (09 Nov 2020 14:00) (92% - 100%)  I&O's Summary    08 Nov 2020 07:01  -  09 Nov 2020 07:00  --------------------------------------------------------  IN: 500 mL / OUT: 275 mL / NET: 225 mL    09 Nov 2020 07:01  -  09 Nov 2020 16:18  --------------------------------------------------------  IN: 0 mL / OUT: 450 mL / NET: -450 mL          MEDICATIONS  (STANDING):  albuterol/ipratropium for Nebulization 3 milliLiter(s) Nebulizer every 4 hours  chlorhexidine 4% Liquid 1 Application(s) Topical <User Schedule>  enoxaparin Injectable 40 milliGRAM(s) SubCutaneous at bedtime  methylPREDNISolone sodium succinate Injectable 60 milliGRAM(s) IV Push every 8 hours  pantoprazole    Tablet 40 milliGRAM(s) Oral before breakfast    MEDICATIONS  (PRN):  acetaminophen   Tablet .. 650 milliGRAM(s) Oral every 6 hours PRN Mild Pain (1 - 3)  ALBUTerol    90 MICROgram(s) HFA Inhaler 1 Puff(s) Inhalation every 4 hours PRN Shortness of Breath and/or Wheezing        LABS                                            14.7                  Neurophils% (auto):   84.0   (11-09 @ 05:03):    8.37 )-----------(194          Lymphocytes% (auto):  9.4                                           45.6                   Eosinphils% (auto):   0.0      Manual%: Neutrophils x    ; Lymphocytes x    ; Eosinophils x    ; Bands%: x    ; Blasts x                                    142    |  105    |  13                  Calcium: 9.7   / iCa: x      (11-09 @ 05:03)    ----------------------------<  137       Magnesium: 1.7                              4.5     |  20     |  0.7              Phosphorous: x MICU Transfer Note    Transfer from: MICU  Transfer to:  ( x ) Medicine/Stepdown   (  ) Telemetry    (  ) RCU    (  ) Palliative    (  ) Stroke Unit    (  ) _______________  Accepting physician: Dr. Greene      MICU COURSE:    25M w/ PMHx of Asthma presents w/ SOB and Wheezing started yesterday morning. Pt reports he woke up with feeling of chest-tightness, SOB and Wheezing. He used albuterol nebulizer throughout the day yesterday. Despite this his symptoms got worse with worsening chest-tightness and SOB so he decided to come to the hospital. He reports he had URTI recently on last Wednesday. He was unable to make an appointment to see his pulmonologist due to COVID. No past hospitalizations for Asthma exacerbations. Denies sick contacts. Admits to SOB/Wheezing and Chest tightness. Denies HA, fatigue, fever, chills, dizziness, change in vision, runny nose, sore throat, CP,cough, abd pain, nausea, vomiting, diarrhea, constipation, blood in stool or urine, and dysuria.      Patient was clinically improved on IV steroids and DuoNeb treatment. Hypercapnia resolved and patient felt shortness of breath was better. He was medically stable for downgrade to stepdown or medical floor.    ASSESSMENT & PLAN:     25M w/ PMHx of Asthma presents w/ SOB and wheezing starting the day before admission, admitted to ICU for severe asthma exacerbation.    # Acute asthma exacerbation 2/2 recent URTI  - No previous hospitalizations or intubations for asthma  - B/l expiratory wheezing on physical exam  - Keep Mg > 2  - C/w Duoneb q4h standing, solumedrol 80mg q12h  - ABG improving  - On supplemental O2 via [place]  - Needs o/p pulmonary f/u for long-term asthma management  - RVP 11/9 positive for rhino/enterovirus; airborne/droplet precautions    # Tachycardia  - Sinus, likely 2/2 nebulizers  - Monitor VS    DVT PPx: Lovenox 40 mg s/c  GI PPX: Protonix 40 mg PO  Diet: Regular Kosher  Activity: Increase as tolerated  Dispo: from home  Code Status: full code     Pending: Clinical improvement, tapering steroids, can eventually be D/c with close outpatient followup with pulmonology      For Follow-Up:    [] 4 pm magnesium, replete as needed      Vital Signs Last 24 Hrs  T(C): 36.2 (09 Nov 2020 08:00), Max: 36.6 (08 Nov 2020 22:20)  T(F): 97.2 (09 Nov 2020 08:00), Max: 97.8 (08 Nov 2020 22:20)  HR: 112 (09 Nov 2020 14:00) (86 - 120)  BP: 127/64 (09 Nov 2020 14:00) (98/63 - 127/64)  BP(mean): 79 (09 Nov 2020 14:00) (75 - 96)  RR: 28 (09 Nov 2020 14:00) (17 - 29)  SpO2: 93% (09 Nov 2020 14:00) (92% - 100%)  I&O's Summary    08 Nov 2020 07:01  -  09 Nov 2020 07:00  --------------------------------------------------------  IN: 500 mL / OUT: 275 mL / NET: 225 mL    09 Nov 2020 07:01  -  09 Nov 2020 16:18  --------------------------------------------------------  IN: 0 mL / OUT: 450 mL / NET: -450 mL          MEDICATIONS  (STANDING):  albuterol/ipratropium for Nebulization 3 milliLiter(s) Nebulizer every 4 hours  chlorhexidine 4% Liquid 1 Application(s) Topical <User Schedule>  enoxaparin Injectable 40 milliGRAM(s) SubCutaneous at bedtime  methylPREDNISolone sodium succinate Injectable 60 milliGRAM(s) IV Push every 8 hours  pantoprazole    Tablet 40 milliGRAM(s) Oral before breakfast    MEDICATIONS  (PRN):  acetaminophen   Tablet .. 650 milliGRAM(s) Oral every 6 hours PRN Mild Pain (1 - 3)  ALBUTerol    90 MICROgram(s) HFA Inhaler 1 Puff(s) Inhalation every 4 hours PRN Shortness of Breath and/or Wheezing        LABS                                            14.7                  Neurophils% (auto):   84.0   (11-09 @ 05:03):    8.37 )-----------(194          Lymphocytes% (auto):  9.4                                           45.6                   Eosinphils% (auto):   0.0      Manual%: Neutrophils x    ; Lymphocytes x    ; Eosinophils x    ; Bands%: x    ; Blasts x                                    142    |  105    |  13                  Calcium: 9.7   / iCa: x      (11-09 @ 05:03)    ----------------------------<  137       Magnesium: 1.7                              4.5     |  20     |  0.7              Phosphorous: x          Signout to be given; pending downgrade location (downgrade location yet to be assigned) MICU Transfer Note    Transfer from: MICU  Transfer to:  ( x ) Medicine/Stepdown   (  ) Telemetry    (  ) RCU    (  ) Palliative    (  ) Stroke Unit    (  ) _______________  Accepting physician: Dr. Greene      MICU COURSE:    25M w/ PMHx of Asthma presents w/ SOB and Wheezing started yesterday morning. Pt reports he woke up with feeling of chest-tightness, SOB and Wheezing. He used albuterol nebulizer throughout the day yesterday. Despite this his symptoms got worse with worsening chest-tightness and SOB so he decided to come to the hospital. He reports he had URTI recently on last Wednesday. He was unable to make an appointment to see his pulmonologist due to COVID. No past hospitalizations for Asthma exacerbations. Denies sick contacts. Admits to SOB/Wheezing and Chest tightness. Denies HA, fatigue, fever, chills, dizziness, change in vision, runny nose, sore throat, CP,cough, abd pain, nausea, vomiting, diarrhea, constipation, blood in stool or urine, and dysuria.      Patient was clinically improved on IV steroids and DuoNeb treatment. Hypercapnia resolved and patient felt shortness of breath was better. He was medically stable for downgrade to stepdown or medical floor.    ASSESSMENT & PLAN:     25M w/ PMHx of Asthma presents w/ SOB and wheezing starting the day before admission, admitted to ICU for severe asthma exacerbation.    # Acute asthma exacerbation 2/2 recent URTI  - No previous hospitalizations or intubations for asthma  - B/l expiratory wheezing on physical exam  - Keep Mg > 2  - C/w Duoneb q4h standing, solumedrol 80mg q12h  - ABG improving  - On supplemental O2 via [place]  - Needs o/p pulmonary f/u for long-term asthma management  - RVP 11/9 positive for rhino/enterovirus; airborne/droplet precautions    # Tachycardia  - Sinus, likely 2/2 nebulizers  - Monitor VS    DVT PPx: Lovenox 40 mg s/c  GI PPX: Protonix 40 mg PO  Diet: Regular Kosher  Activity: Increase as tolerated  Dispo: from home  Code Status: full code     Pending: Clinical improvement, tapering steroids, can eventually be D/c with close outpatient followup with pulmonology      For Follow-Up:    [] 4 pm magnesium, replete as needed      Vital Signs Last 24 Hrs  T(C): 36.2 (09 Nov 2020 08:00), Max: 36.6 (08 Nov 2020 22:20)  T(F): 97.2 (09 Nov 2020 08:00), Max: 97.8 (08 Nov 2020 22:20)  HR: 112 (09 Nov 2020 14:00) (86 - 120)  BP: 127/64 (09 Nov 2020 14:00) (98/63 - 127/64)  BP(mean): 79 (09 Nov 2020 14:00) (75 - 96)  RR: 28 (09 Nov 2020 14:00) (17 - 29)  SpO2: 93% (09 Nov 2020 14:00) (92% - 100%)  I&O's Summary    08 Nov 2020 07:01  -  09 Nov 2020 07:00  --------------------------------------------------------  IN: 500 mL / OUT: 275 mL / NET: 225 mL    09 Nov 2020 07:01  -  09 Nov 2020 16:18  --------------------------------------------------------  IN: 0 mL / OUT: 450 mL / NET: -450 mL          MEDICATIONS  (STANDING):  albuterol/ipratropium for Nebulization 3 milliLiter(s) Nebulizer every 4 hours  chlorhexidine 4% Liquid 1 Application(s) Topical <User Schedule>  enoxaparin Injectable 40 milliGRAM(s) SubCutaneous at bedtime  methylPREDNISolone sodium succinate Injectable 60 milliGRAM(s) IV Push every 8 hours  pantoprazole    Tablet 40 milliGRAM(s) Oral before breakfast    MEDICATIONS  (PRN):  acetaminophen   Tablet .. 650 milliGRAM(s) Oral every 6 hours PRN Mild Pain (1 - 3)  ALBUTerol    90 MICROgram(s) HFA Inhaler 1 Puff(s) Inhalation every 4 hours PRN Shortness of Breath and/or Wheezing        LABS                                            14.7                  Neurophils% (auto):   84.0   (11-09 @ 05:03):    8.37 )-----------(194          Lymphocytes% (auto):  9.4                                           45.6                   Eosinphils% (auto):   0.0      Manual%: Neutrophils x    ; Lymphocytes x    ; Eosinophils x    ; Bands%: x    ; Blasts x                                    142    |  105    |  13                  Calcium: 9.7   / iCa: x      (11-09 @ 05:03)    ----------------------------<  137       Magnesium: 1.7                              4.5     |  20     |  0.7              Phosphorous: x          Update pm:    No longer downgrading patient today 11/9. Will keep pt in MICU overnight for continued monitoring MICU Transfer Note    Transfer from: MICU  Transfer to:  ( x ) Medicine/Stepdown   (  ) Telemetry    (  ) RCU    (  ) Palliative    (  ) Stroke Unit    (  ) _______________  Accepting physician: Dr. Greene      MICU COURSE:    25M w/ PMHx of Asthma presents w/ SOB and Wheezing started yesterday morning. Pt reports he woke up with feeling of chest-tightness, SOB and Wheezing. He used albuterol nebulizer throughout the day yesterday. Despite this his symptoms got worse with worsening chest-tightness and SOB so he decided to come to the hospital. He reports he had URTI recently on last Wednesday. He was unable to make an appointment to see his pulmonologist due to COVID. No past hospitalizations for Asthma exacerbations. Denies sick contacts. Admits to SOB/Wheezing and Chest tightness. Denies HA, fatigue, fever, chills, dizziness, change in vision, runny nose, sore throat, CP,cough, abd pain, nausea, vomiting, diarrhea, constipation, blood in stool or urine, and dysuria.      Patient was clinically improved on IV steroids and DuoNeb treatment. Hypercapnia resolved and patient felt shortness of breath was better. He was medically stable for downgrade to stepdown or medical floor.    ASSESSMENT & PLAN:     25M w/ PMHx of Asthma presents w/ SOB and wheezing starting the day before admission, admitted to ICU for severe asthma exacerbation.    # Acute asthma exacerbation 2/2 recent URTI  - No previous hospitalizations or intubations for asthma  - B/l expiratory wheezing on physical exam  - Keep Mg > 2  - C/w Duoneb q4h standing, solumedrol 80mg q12h; starting symbicort  - ABG improving  - On supplemental O2 via [place]  - Needs o/p pulmonary f/u for long-term asthma management  - RVP 11/9 positive for rhino/enterovirus    # Tachycardia  - Sinus, likely 2/2 nebulizers  - Monitor VS    DVT PPx: Lovenox 40 mg s/c  GI PPX: Protonix 40 mg PO  Diet: Regular Kosher  Activity: Increase as tolerated  Dispo: from home  Code Status: full code     Pending: Clinical improvement, tapering steroids, can eventually be D/c with close outpatient followup with pulmonology      For Follow-Up:    Nothing; pending clinical improvement      Vital Signs Last 24 Hrs  T(C): 36.2 (09 Nov 2020 08:00), Max: 36.6 (08 Nov 2020 22:20)  T(F): 97.2 (09 Nov 2020 08:00), Max: 97.8 (08 Nov 2020 22:20)  HR: 112 (09 Nov 2020 14:00) (86 - 120)  BP: 127/64 (09 Nov 2020 14:00) (98/63 - 127/64)  BP(mean): 79 (09 Nov 2020 14:00) (75 - 96)  RR: 28 (09 Nov 2020 14:00) (17 - 29)  SpO2: 93% (09 Nov 2020 14:00) (92% - 100%)  I&O's Summary    08 Nov 2020 07:01  -  09 Nov 2020 07:00  --------------------------------------------------------  IN: 500 mL / OUT: 275 mL / NET: 225 mL    09 Nov 2020 07:01  -  09 Nov 2020 16:18  --------------------------------------------------------  IN: 0 mL / OUT: 450 mL / NET: -450 mL          MEDICATIONS  (STANDING):  albuterol/ipratropium for Nebulization 3 milliLiter(s) Nebulizer every 4 hours  chlorhexidine 4% Liquid 1 Application(s) Topical <User Schedule>  enoxaparin Injectable 40 milliGRAM(s) SubCutaneous at bedtime  methylPREDNISolone sodium succinate Injectable 60 milliGRAM(s) IV Push every 8 hours  pantoprazole    Tablet 40 milliGRAM(s) Oral before breakfast    MEDICATIONS  (PRN):  acetaminophen   Tablet .. 650 milliGRAM(s) Oral every 6 hours PRN Mild Pain (1 - 3)  ALBUTerol    90 MICROgram(s) HFA Inhaler 1 Puff(s) Inhalation every 4 hours PRN Shortness of Breath and/or Wheezing        LABS                                            14.7                  Neurophils% (auto):   84.0   (11-09 @ 05:03):    8.37 )-----------(194          Lymphocytes% (auto):  9.4                                           45.6                   Eosinphils% (auto):   0.0      Manual%: Neutrophils x    ; Lymphocytes x    ; Eosinophils x    ; Bands%: x    ; Blasts x                                    142    |  105    |  13                  Calcium: 9.7   / iCa: x      (11-09 @ 05:03)    ----------------------------<  137       Magnesium: 1.7                              4.5     |  20     |  0.7              Phosphorous: x

## 2020-11-09 NOTE — PROGRESS NOTE ADULT - ASSESSMENT
25M w/ PMHx of Asthma presents w/ SOB and wheezing starting the day before admission, admitted to ICU for severe asthma exacerbation.    # Acute asthma exacerbation 2/2 recent URTI  - No previous hospitalizations or intubations for asthma  - B/l expiratory wheezing on physical exam  - Keep Mg > 2  - C/w Duoneb q4h standing, solumedrol 80mg q12h  - ABG improving  - Needs o/p pulmonary f/u for long-term asthma management    # Tachycardia  - Sinus, likely 2/2 nebulizers  - Monitor VS    DVT PPx: Lovenox 40 mg s/c  GI PPX: Protonix 40 mg PO  Diet: Regular Kosher  Activity: Increase as tolerated  Dispo: from home  Code Status: full code     Pending: Clinical improvement, tapering steroids, can eventually be D/c with close outpatient followup with pulmonology 25M w/ PMHx of Asthma presents w/ SOB and wheezing starting the day before admission, admitted to ICU for severe asthma exacerbation.    # Acute asthma exacerbation 2/2 recent URTI  - No previous hospitalizations or intubations for asthma  - B/l expiratory wheezing on physical exam  - Keep Mg > 2  - C/w Duoneb q4h standing, solumedrol 80mg q12h  - ABG improving  - On supplemental O2 via [place]  - Needs o/p pulmonary f/u for long-term asthma management  - RVP 11/9 positive for rhino/enterovirus; airborne/droplet precautions    # Tachycardia  - Sinus, likely 2/2 nebulizers  - Monitor VS    DVT PPx: Lovenox 40 mg s/c  GI PPX: Protonix 40 mg PO  Diet: Regular Kosher  Activity: Increase as tolerated  Dispo: from home  Code Status: full code     Pending: Clinical improvement, tapering steroids, can eventually be D/c with close outpatient followup with pulmonology

## 2020-11-09 NOTE — PROGRESS NOTE ADULT - SUBJECTIVE AND OBJECTIVE BOX
Patient is a 25y old  Male who presents with a chief complaint of SOB/Wheezing (08 Nov 2020 15:43)        Over Night Events:  Afebrile. Never on pressor.s      ROS:     All pertinent ROS are negative except HPI         PHYSICAL EXAM    ICU Vital Signs Last 24 Hrs  T(C): 36.2 (09 Nov 2020 08:00), Max: 37.3 (08 Nov 2020 15:19)  T(F): 97.2 (09 Nov 2020 08:00), Max: 99.2 (08 Nov 2020 15:19)  HR: 114 (09 Nov 2020 10:00) (86 - 116)  BP: 109/73 (09 Nov 2020 10:00) (98/63 - 130/86)  BP(mean): 83 (09 Nov 2020 10:00) (75 - 96)  ABP: --  ABP(mean): --  RR: 22 (09 Nov 2020 10:00) (17 - 26)  SpO2: 95% (09 Nov 2020 10:00) (94% - 100%)      CONSTITUTIONAL:   Ill appearing.  Well nourished.  NAD    ENT:   Airway patent,   Mouth with normal mucosa.   No thrush    EYES:   Pupils equal,   Round and reactive to light.    CARDIAC:   Normal rate,   Regular rhythm.    No edema      Vascular:  Normal systolic impulse  No Carotid bruits    RESPIRATORY:   Diffuse bilateral wheezing appreciated  Bilateral BS  Normal chest expansion  Not tachypneic,  No use of accessory muscles    GASTROINTESTINAL:  Abdomen soft,   Non-tender,   No guarding,   + BS    MUSCULOSKELETAL:   Range of motion is not limited,  No clubbing, cyanosis    NEUROLOGICAL:   AOx4  Alert and oriented   No motor  deficits.  pertinent DTRs normal    SKIN:   Skin normal color for race,   Warm and dry  No evidence of rash.    PSYCHIATRIC:   Normal mood and affect.   No apparent risk to self or others.        11-08-20 @ 07:01  -  11-09-20 @ 07:00  --------------------------------------------------------  IN:    Oral Fluid: 500 mL  Total IN: 500 mL    OUT:    Voided (mL): 275 mL  Total OUT: 275 mL    Total NET: 225 mL          LABS:                            14.7   8.37  )-----------( 194      ( 09 Nov 2020 05:03 )             45.6                                               11-09    142  |  105  |  13  ----------------------------<  137<H>  4.5   |  20  |  0.7    Ca    9.7      09 Nov 2020 05:03  Mg     1.7     11-09    TPro  7.2  /  Alb  4.7  /  TBili  0.3  /  DBili  x   /  AST  26  /  ALT  17  /  AlkPhos  59  11-08                                                                                           LIVER FUNCTIONS - ( 08 Nov 2020 04:02 )  Alb: 4.7 g/dL / Pro: 7.2 g/dL / ALK PHOS: 59 U/L / ALT: 17 U/L / AST: 26 U/L / GGT: x                                                                                                                                   ABG - ( 09 Nov 2020 05:17 )  pH, Arterial: 7.40  pH, Blood: x     /  pCO2: 38    /  pO2: 74    / HCO3: 24    / Base Excess: -0.7  /  SaO2: 96                  MEDICATIONS  (STANDING):  albuterol/ipratropium for Nebulization 3 milliLiter(s) Nebulizer every 4 hours  chlorhexidine 4% Liquid 1 Application(s) Topical <User Schedule>  enoxaparin Injectable 40 milliGRAM(s) SubCutaneous at bedtime  methylPREDNISolone sodium succinate Injectable 60 milliGRAM(s) IV Push every 8 hours  pantoprazole    Tablet 40 milliGRAM(s) Oral before breakfast    MEDICATIONS  (PRN):  acetaminophen   Tablet .. 650 milliGRAM(s) Oral every 6 hours PRN Mild Pain (1 - 3)  ALBUTerol    90 MICROgram(s) HFA Inhaler 1 Puff(s) Inhalation every 4 hours PRN Shortness of Breath and/or Wheezing      New X-rays reviewed:                                                                                  ECHO    CXR interpreted by me:       Patient is a 25y old  Male who presents with a chief complaint of SOB/Wheezing (08 Nov 2020 15:43)        Over Night Events:  Afebrile. Never on pressors.  Feels better.  Still with wheezing       ROS:     All pertinent ROS are negative except HPI         PHYSICAL EXAM    ICU Vital Signs Last 24 Hrs  T(C): 36.2 (09 Nov 2020 08:00), Max: 37.3 (08 Nov 2020 15:19)  T(F): 97.2 (09 Nov 2020 08:00), Max: 99.2 (08 Nov 2020 15:19)  HR: 114 (09 Nov 2020 10:00) (86 - 116)  BP: 109/73 (09 Nov 2020 10:00) (98/63 - 130/86)  BP(mean): 83 (09 Nov 2020 10:00) (75 - 96)  ABP: --  ABP(mean): --  RR: 22 (09 Nov 2020 10:00) (17 - 26)  SpO2: 95% (09 Nov 2020 10:00) (94% - 100%)      CONSTITUTIONAL:   Well nourished.  NAD    ENT:   Airway patent,   Mouth with normal mucosa.   No thrush    EYES:   Pupils equal,   Round and reactive to light.    CARDIAC:   Tachycardic   Regular rhythm.    No edema      Vascular:  Normal systolic impulse  No Carotid bruits    RESPIRATORY:   Diffuse bilateral wheezing appreciated  Bilateral BS  Normal chest expansion  Not tachypneic,  No use of accessory muscles    GASTROINTESTINAL:  Abdomen soft,   Non-tender,   No guarding,   + BS    MUSCULOSKELETAL:   Range of motion is not limited,  No clubbing, cyanosis    NEUROLOGICAL:   AOx4  Alert and oriented   No motor  deficits.  pertinent DTRs normal    SKIN:   Skin normal color for race,   Warm and dry  No evidence of rash.    PSYCHIATRIC:   Normal mood and affect.   No apparent risk to self or others.        11-08-20 @ 07:01  -  11-09-20 @ 07:00  --------------------------------------------------------  IN:    Oral Fluid: 500 mL  Total IN: 500 mL    OUT:    Voided (mL): 275 mL  Total OUT: 275 mL    Total NET: 225 mL          LABS:                            14.7   8.37  )-----------( 194      ( 09 Nov 2020 05:03 )             45.6                                               11-09    142  |  105  |  13  ----------------------------<  137<H>  4.5   |  20  |  0.7    Ca    9.7      09 Nov 2020 05:03  Mg     1.7     11-09    TPro  7.2  /  Alb  4.7  /  TBili  0.3  /  DBili  x   /  AST  26  /  ALT  17  /  AlkPhos  59  11-08                                                                                           LIVER FUNCTIONS - ( 08 Nov 2020 04:02 )  Alb: 4.7 g/dL / Pro: 7.2 g/dL / ALK PHOS: 59 U/L / ALT: 17 U/L / AST: 26 U/L / GGT: x                                                                                                                                   ABG - ( 09 Nov 2020 05:17 )  pH, Arterial: 7.40  pH, Blood: x     /  pCO2: 38    /  pO2: 74    / HCO3: 24    / Base Excess: -0.7  /  SaO2: 96                  MEDICATIONS  (STANDING):  albuterol/ipratropium for Nebulization 3 milliLiter(s) Nebulizer every 4 hours  chlorhexidine 4% Liquid 1 Application(s) Topical <User Schedule>  enoxaparin Injectable 40 milliGRAM(s) SubCutaneous at bedtime  methylPREDNISolone sodium succinate Injectable 60 milliGRAM(s) IV Push every 8 hours  pantoprazole    Tablet 40 milliGRAM(s) Oral before breakfast    MEDICATIONS  (PRN):  acetaminophen   Tablet .. 650 milliGRAM(s) Oral every 6 hours PRN Mild Pain (1 - 3)  ALBUTerol    90 MICROgram(s) HFA Inhaler 1 Puff(s) Inhalation every 4 hours PRN Shortness of Breath and/or Wheezing      New X-rays reviewed:                                                                                  ECHO    CXR interpreted by me:

## 2020-11-09 NOTE — PROGRESS NOTE ADULT - SUBJECTIVE AND OBJECTIVE BOX
DAILY PROGRESS NOTE ICU  ===========================================================    Patient Information:  LYDIA STARK  /  25y  /  Male  /  MRN#: [id number]    Hospital Day: 1d   Location: [facility]  A ([facility] ICU)    |:::::::::::::::::::::::::::| SUBJECTIVE |:::::::::::::::::::::::::::|    OVERNIGHT EVENTS: No acute events overnight.   TODAY: Patient was seen today at bedside. Feeling much improved compared to yesterday but still having mild wheezing. Review of systems is otherwise negative.    |:::::::::::::::::::::::::::| ADMISSION HPI |:::::::::::::::::::::::::::|    HPI:  25M w/ PMHx of Asthma presents w/ SOB and Wheezing started yesterday morning. Pt reports he woke up with feeling of chest-tightness, SOB and Wheezing. He used albuterol nebulizer throughout the day yesterday. Despite this his symptoms got worse with worsening chest-tightness and SOB so he decided to come to the hospital. He reports he had URTI recently on last Wednesday. He was unable to make an appointment to see his pulmonologist due to COVID. No past hospitalizations for Asthma exacerbations. Denies sick contacts. Admits to SOB/Wheezing and Chest tightness. Denies HA, fatigue, fever, chills, dizziness, change in vision, runny nose, sore throat, CP,cough, abd pain, nausea, vomiting, diarrhea, constipation, blood in stool or urine, and dysuria.      ED vitals: /90, , RR 20, T 97.8f, SPO2 94% on RA  EMS administered 125mg solumedrol, 1x duoneb  ED: received 2L LR, Mg infusion and duonebsx2 (08 Nov 2020 08:56)      HOME MEDICATIONS  albuterol 0.63 mg/3 mL (0.021%) inhalation solution: 3 milliliter(s) by nebulizer every 6 hours       |:::::::::::::::::::::::::::| OBJECTIVE |:::::::::::::::::::::::::::|    STANDING MEDICATIONS  albuterol/ipratropium for Nebulization 3 milliLiter(s) Nebulizer every 4 hours  chlorhexidine 4% Liquid 1 Application(s) Topical <User Schedule>  enoxaparin Injectable 40 milliGRAM(s) SubCutaneous at bedtime  methylPREDNISolone sodium succinate Injectable 60 milliGRAM(s) IV Push every 8 hours  pantoprazole    Tablet 40 milliGRAM(s) Oral before breakfast    PRN MEDICATIONS  acetaminophen   Tablet .. 650 milliGRAM(s) Oral every 6 hours PRN  ALBUTerol    90 MICROgram(s) HFA Inhaler 1 Puff(s) Inhalation every 4 hours PRN    DRIPS      VITAL SIGNS: Last 24 Hours  T(C): 36.2 (09 Nov 2020 08:00), Max: 37.3 (08 Nov 2020 15:19)  T(F): 97.2 (09 Nov 2020 08:00), Max: 99.2 (08 Nov 2020 15:19)  HR: 112 (09 Nov 2020 14:00) (86 - 120)  BP: 127/64 (09 Nov 2020 14:00) (98/63 - 130/86)  BP(mean): 79 (09 Nov 2020 14:00) (75 - 96)  RR: 28 (09 Nov 2020 14:00) (17 - 29)  SpO2: 93% (09 Nov 2020 14:00) (92% - 100%)    Input-Output    11-08-20 @ 07:01  -  11-09-20 @ 07:00  --------------------------------------------------------  IN:    Oral Fluid: 500 mL  Total IN: 500 mL    OUT:    Voided (mL): 275 mL  Total OUT: 275 mL    Total NET: 225 mL      11-09-20 @ 07:01  -  11-09-20 @ 14:27  --------------------------------------------------------  IN:  Total IN: 0 mL    OUT:    Voided (mL): 450 mL  Total OUT: 450 mL    Total NET: -450 mL          Ventilator      PHYSICAL EXAM:  GEN: No acute distress.  LUNGS: Diffuse wheezing bilaterally.  HEART: Tachycardic, regular. No murmurs.  ABD: Soft, non-tender, non-distended.  EXT: Normal range of motion. No edema.  NEURO: AAOX3. Nonfocal.  PSYCH: Cooperative, appropriate.    LAB RESULTS:                        14.7   8.37  )-----------( 194      ( 09 Nov 2020 05:03 )             45.6     11-09    142  |  105  |  13  ----------------------------<  137<H>  4.5   |  20  |  0.7    Ca    9.7      09 Nov 2020 05:03  Mg     1.7     11-09    TPro  7.2  /  Alb  4.7  /  TBili  0.3  /  DBili  x   /  AST  26  /  ALT  17  /  AlkPhos  59  11-08        ABG - ( 09 Nov 2020 05:17 )  pH, Arterial: 7.40  pH, Blood: x     /  pCO2: 38    /  pO2: 74    / HCO3: 24    / Base Excess: -0.7  /  SaO2: 96                Lactate, Blood: 4.8 mmol/L <HH> [0.7 - 2.0] [TYPE:(C=Critical, N=Notification, A=Abnormal) C  TESTS: LACTATE_  DATE/TIME CALLED: 11/08/20 13:03_  CALLED TO: DR. DEL ROSARIO_  READ BACK (2 Patient Identifiers)(Y/N): Y_  READ BACK VALUES (Y/N): Y_  CALLED BY: SERJIO_] (11-08-20 @ 11:29)        MICROBIOLOGY:    RADIOLOGY:    < from: Xray Chest 1 View- PORTABLE-Routine (Xray Chest 1 View- PORTABLE-Routine .) (11.08.20 @ 03:29) >  Impression:    No radiographic evidence of acute cardiopulmonary disease.  < end of copied text >      ALLERGIES:  No Known Allergies      RECENT ORDERS:  Magnesium, Serum: 16:00 (11-09-20 @ 11:25)  Procalcitonin, Serum: 16:00 (11-09-20 @ 11:25)  Respiratory Viral Panel with COVID-19 by JOHN: STAT (11-09-20 @ 11:25)  Diet, Regular:   Kosher (11-09-20 @ 08:20)  Blood Gas Arterial, Lactate: 05:17 (11-09-20 @ 05:18)  Blood Gas Profile - Arterial: 05:17 (11-09-20 @ 05:18)  Blood Gas Profile - Arterial: Routine (11-09-20 @ 05:03)      ===========================================================

## 2020-11-09 NOTE — PROGRESS NOTE ADULT - ASSESSMENT
IMPRESSION:  Severe Asthma exacerbation  Acute hypercapnic respiratory failure  Former smoker    PLAN:    CNS: avoid CNS depressants.     HEENT: Oral care    PULMONARY:  HOB @ 30 degrees.  Aspiration precautions . Replete Mg. BiPAP 14/8 PRN. Solumedrol 60mg q8h. Nebs q4h and PRN. Daily peak flow.     CARDIOVASCULAR: Goal MAP >60. Keep I=O    GI: GI prophylaxis.  Feeding.  Bowel regimen     RENAL:  Follow up lytes.  Correct as needed. Replete Mg.     INFECTIOUS DISEASE: Follow up cultures. RVP. Check Procal.     HEMATOLOGICAL:  DVT prophylaxis. Venous duplex.     ENDOCRINE:  Follow up FS.  Insulin protocol if needed.    MUSCULOSKELETAL: ambulate as tolerated     Downgrade to SDU    OP pulm FU IMPRESSION:  Asthma exacerbation  Acute hypercapnic respiratory failure resolved   Former smoker    PLAN:    CNS: avoid CNS depressants.     HEENT: Oral care    PULMONARY:  HOB @ 30 degrees.  Aspiration precautions . Replete Mg. BiPAP 14/8 PRN. Solumedrol 60mg q8h. Nebs q4h and PRN. Daily peak flow.     CARDIOVASCULAR: Goal MAP >60. Keep I=O    GI: GI prophylaxis.  Feeding.  Bowel regimen     RENAL:  Follow up lytes.  Correct as needed. Replete Mg.     INFECTIOUS DISEASE: Follow up cultures. RVP. Check Procal.     HEMATOLOGICAL:  DVT prophylaxis. Venous duplex.     ENDOCRINE:  Follow up FS.  Insulin protocol if needed.    MUSCULOSKELETAL: ambulate as tolerated     MICU for today     OP pulm FU

## 2020-11-09 NOTE — CHART NOTE - NSCHARTNOTEFT_GEN_A_CORE
Patient has made CA and Dietitian known regarding his Kosher diet preference early this morning, hence a [facility] Kosher Regular [facility]. and I also informed the doctor in person during round.

## 2020-11-10 LAB
ANION GAP SERPL CALC-SCNC: 12 MMOL/L — SIGNIFICANT CHANGE UP (ref 7–14)
BUN SERPL-MCNC: 19 MG/DL — SIGNIFICANT CHANGE UP (ref 10–20)
CALCIUM SERPL-MCNC: 9.9 MG/DL — SIGNIFICANT CHANGE UP (ref 8.5–10.1)
CHLORIDE SERPL-SCNC: 104 MMOL/L — SIGNIFICANT CHANGE UP (ref 98–110)
CO2 SERPL-SCNC: 23 MMOL/L — SIGNIFICANT CHANGE UP (ref 17–32)
CREAT SERPL-MCNC: 0.7 MG/DL — SIGNIFICANT CHANGE UP (ref 0.7–1.5)
GLUCOSE SERPL-MCNC: 136 MG/DL — HIGH (ref 70–99)
HCT VFR BLD CALC: 47.2 % — SIGNIFICANT CHANGE UP (ref 42–52)
HGB BLD-MCNC: 15.3 G/DL — SIGNIFICANT CHANGE UP (ref 14–18)
MAGNESIUM SERPL-MCNC: 2 MG/DL — SIGNIFICANT CHANGE UP (ref 1.8–2.4)
MCHC RBC-ENTMCNC: 26.1 PG — LOW (ref 27–31)
MCHC RBC-ENTMCNC: 32.4 G/DL — SIGNIFICANT CHANGE UP (ref 32–37)
MCV RBC AUTO: 80.5 FL — SIGNIFICANT CHANGE UP (ref 80–94)
NRBC # BLD: 0 /100 WBCS — SIGNIFICANT CHANGE UP (ref 0–0)
PLATELET # BLD AUTO: 245 K/UL — SIGNIFICANT CHANGE UP (ref 130–400)
POTASSIUM SERPL-MCNC: 4.6 MMOL/L — SIGNIFICANT CHANGE UP (ref 3.5–5)
POTASSIUM SERPL-SCNC: 4.6 MMOL/L — SIGNIFICANT CHANGE UP (ref 3.5–5)
PROCALCITONIN SERPL-MCNC: 0.04 NG/ML — SIGNIFICANT CHANGE UP (ref 0.02–0.1)
RBC # BLD: 5.86 M/UL — SIGNIFICANT CHANGE UP (ref 4.7–6.1)
RBC # FLD: 14.4 % — SIGNIFICANT CHANGE UP (ref 11.5–14.5)
SODIUM SERPL-SCNC: 139 MMOL/L — SIGNIFICANT CHANGE UP (ref 135–146)
WBC # BLD: 12.47 K/UL — HIGH (ref 4.8–10.8)
WBC # FLD AUTO: 12.47 K/UL — HIGH (ref 4.8–10.8)

## 2020-11-10 PROCEDURE — 71045 X-RAY EXAM CHEST 1 VIEW: CPT | Mod: 26

## 2020-11-10 RX ORDER — BUDESONIDE AND FORMOTEROL FUMARATE DIHYDRATE 160; 4.5 UG/1; UG/1
2 AEROSOL RESPIRATORY (INHALATION)
Refills: 0 | Status: DISCONTINUED | OUTPATIENT
Start: 2020-11-10 | End: 2020-11-11

## 2020-11-10 RX ORDER — IPRATROPIUM/ALBUTEROL SULFATE 18-103MCG
3 AEROSOL WITH ADAPTER (GRAM) INHALATION EVERY 4 HOURS
Refills: 0 | Status: DISCONTINUED | OUTPATIENT
Start: 2020-11-10 | End: 2020-11-11

## 2020-11-10 RX ADMIN — ENOXAPARIN SODIUM 40 MILLIGRAM(S): 100 INJECTION SUBCUTANEOUS at 21:17

## 2020-11-10 RX ADMIN — Medication 60 MILLIGRAM(S): at 17:21

## 2020-11-10 RX ADMIN — Medication 3 MILLILITER(S): at 09:41

## 2020-11-10 RX ADMIN — Medication 3 MILLILITER(S): at 15:30

## 2020-11-10 RX ADMIN — Medication 3 MILLILITER(S): at 05:20

## 2020-11-10 RX ADMIN — Medication 60 MILLIGRAM(S): at 06:37

## 2020-11-10 RX ADMIN — CHLORHEXIDINE GLUCONATE 1 APPLICATION(S): 213 SOLUTION TOPICAL at 06:37

## 2020-11-10 RX ADMIN — Medication 3 MILLILITER(S): at 01:20

## 2020-11-10 RX ADMIN — PANTOPRAZOLE SODIUM 40 MILLIGRAM(S): 20 TABLET, DELAYED RELEASE ORAL at 06:27

## 2020-11-10 RX ADMIN — BUDESONIDE AND FORMOTEROL FUMARATE DIHYDRATE 2 PUFF(S): 160; 4.5 AEROSOL RESPIRATORY (INHALATION) at 23:05

## 2020-11-10 NOTE — PROGRESS NOTE ADULT - ASSESSMENT
IMPRESSION:  Asthma exacerbation, improving  Enterovirus infection  Acute hypercapnic respiratory failure, resolved   Former smoker    PLAN:    CNS: avoid CNS depressants.     HEENT: Oral care    PULMONARY:  HOB @ 30 degrees.  Aspiration precautions . BiPAP 14/8 PRN. Solumedrol 60mg q12h. Nebs PRN. Daily peak flow.     CARDIOVASCULAR: Goal MAP >60. Keep I=O. Avoid volume overload.     GI: GI prophylaxis.  Feeding.  Bowel regimen     RENAL:  Follow up lytes.  Correct as needed.     INFECTIOUS DISEASE: Follow up cultures. FU Procal.     HEMATOLOGICAL:  DVT prophylaxis. FU venous duplex.     ENDOCRINE:  Follow up FS.  Insulin protocol if needed.    MUSCULOSKELETAL: ambulate as tolerated     Downgrade to general medical floor    OP pulm FU IMPRESSION:  Asthma exacerbation, improving  Enterovirus infection  Acute hypercapnic respiratory failure, resolved   Former smoker    PLAN:    CNS: avoid CNS depressants.     HEENT: Oral care    PULMONARY:  HOB @ 30 degrees.  Aspiration precautions.  Solumedrol 60mg q12h. Nebs PRN. Daily peak flow.     CARDIOVASCULAR: Goal MAP >60. Keep I=O. Avoid volume overload.     GI: GI prophylaxis.  Feeding.  Bowel regimen     RENAL:  Follow up lytes.  Correct as needed.     INFECTIOUS DISEASE: Follow up cultures. FU Procal.     HEMATOLOGICAL:  DVT prophylaxis. FU venous duplex.     ENDOCRINE:  Follow up FS.  Insulin protocol if needed.    MUSCULOSKELETAL: ambulate as tolerated     Downgrade to general medical floor    OP pulm FU

## 2020-11-10 NOTE — PROGRESS NOTE ADULT - SUBJECTIVE AND OBJECTIVE BOX
Patient is a 25y old  Male who presents with a chief complaint of SOB/Wheezing (10 Nov 2020 06:23)        Over Night Events:  No overnight events. Afebrile. On 3L NC, satting 97%. Never on pressors.       ROS:     All pertinent ROS are negative except HPI         PHYSICAL EXAM    ICU Vital Signs Last 24 Hrs  T(C): 36.5 (10 Nov 2020 07:00), Max: 36.6 (09 Nov 2020 16:28)  T(F): 97.7 (10 Nov 2020 07:00), Max: 97.9 (09 Nov 2020 16:28)  HR: 104 (10 Nov 2020 10:00) (70 - 120)  BP: 125/75 (10 Nov 2020 10:00) (110/62 - 142/70)  BP(mean): 111 (10 Nov 2020 10:00) (79 - 111)  ABP: --  ABP(mean): --  RR: 25 (10 Nov 2020 10:00) (17 - 29)  SpO2: 97% (10 Nov 2020 10:00) (91% - 97%)      CONSTITUTIONAL:  Well nourished.  NAD    ENT:   Airway patent,   Mouth with normal mucosa.   No thrush    EYES:   Pupils equal,   Round and reactive to light.    CARDIAC:   Normal rate,   Regular rhythm.    No edema    Vascular:  Normal systolic impulse  No Carotid bruits    RESPIRATORY:   Still diffuse wheezing, but improved compared to prior exam    Bilateral BS  Normal chest expansion  Not tachypneic,  No use of accessory muscles    GASTROINTESTINAL:  Abdomen soft,   Non-tender,   No guarding,   + BS    MUSCULOSKELETAL:   Range of motion is not limited,  No clubbing, cyanosis    NEUROLOGICAL:   AOx4  Follows commands  nonfocal  Alert and oriented   No motor  deficits.  pertinent DTRs normal    SKIN:   Skin normal color for race,   Warm and dry  No evidence of rash.      11-09-20 @ 07:01  -  11-10-20 @ 07:00  --------------------------------------------------------  IN:  Total IN: 0 mL    OUT:    Voided (mL): 750 mL  Total OUT: 750 mL    Total NET: -750 mL          LABS:                            15.3   12.47 )-----------( 245      ( 10 Nov 2020 04:30 )             47.2                                               11-10    139  |  104  |  19  ----------------------------<  136<H>  4.6   |  23  |  0.7    Ca    9.9      10 Nov 2020 04:30  Mg     2.0     11-10                                                                                                                                      Culture - Blood (collected 08 Nov 2020 11:29)  Source: .Blood None  Preliminary Report (09 Nov 2020 18:02):    No growth to date.                                                                                       ABG - ( 09 Nov 2020 05:17 )  pH, Arterial: 7.40  pH, Blood: x     /  pCO2: 38    /  pO2: 74    / HCO3: 24    / Base Excess: -0.7  /  SaO2: 96                  MEDICATIONS  (STANDING):  albuterol/ipratropium for Nebulization 3 milliLiter(s) Nebulizer every 4 hours  chlorhexidine 4% Liquid 1 Application(s) Topical <User Schedule>  enoxaparin Injectable 40 milliGRAM(s) SubCutaneous at bedtime  methylPREDNISolone sodium succinate Injectable 60 milliGRAM(s) IV Push every 8 hours  pantoprazole    Tablet 40 milliGRAM(s) Oral before breakfast    MEDICATIONS  (PRN):  acetaminophen   Tablet .. 650 milliGRAM(s) Oral every 6 hours PRN Mild Pain (1 - 3)  ALBUTerol    90 MICROgram(s) HFA Inhaler 1 Puff(s) Inhalation every 4 hours PRN Shortness of Breath and/or Wheezing      New X-rays reviewed:                                                                                  ECHO    CXR interpreted by me:       Patient is a 25y old  Male who presents with a chief complaint of SOB/Wheezing (10 Nov 2020 06:23)        Over Night Events:  No overnight events. Afebrile. On 3L NC, satting 97%. Never on pressors. Feels better       ROS:     All pertinent ROS are negative except HPI         PHYSICAL EXAM    ICU Vital Signs Last 24 Hrs  T(C): 36.5 (10 Nov 2020 07:00), Max: 36.6 (09 Nov 2020 16:28)  T(F): 97.7 (10 Nov 2020 07:00), Max: 97.9 (09 Nov 2020 16:28)  HR: 104 (10 Nov 2020 10:00) (70 - 120)  BP: 125/75 (10 Nov 2020 10:00) (110/62 - 142/70)  BP(mean): 111 (10 Nov 2020 10:00) (79 - 111)  ABP: --  ABP(mean): --  RR: 25 (10 Nov 2020 10:00) (17 - 29)  SpO2: 97% (10 Nov 2020 10:00) (91% - 97%)      CONSTITUTIONAL:  Well nourished.  NAD    ENT:   Airway patent,   Mouth with normal mucosa.   No thrush    EYES:   Pupils equal,   Round and reactive to light.    CARDIAC:   Normal rate,   Regular rhythm.    No edema    Vascular:  Normal systolic impulse  No Carotid bruits    RESPIRATORY:   [place] exp wheezing   Bilateral BS  Normal chest expansion  Not tachypneic,  No use of accessory muscles    GASTROINTESTINAL:  Abdomen soft,   Non-tender,   No guarding,   + BS    MUSCULOSKELETAL:   Range of motion is not limited,  No clubbing, cyanosis    NEUROLOGICAL:   AOx4  Follows commands  nonfocal  Alert and oriented   No motor  deficits.  pertinent DTRs normal    SKIN:   Skin normal color for race,   Warm and dry  No evidence of rash.      11-09-20 @ 07:01  -  11-10-20 @ 07:00  --------------------------------------------------------  IN:  Total IN: 0 mL    OUT:    Voided (mL): 750 mL  Total OUT: 750 mL    Total NET: -750 mL          LABS:                            15.3   12.47 )-----------( 245      ( 10 Nov 2020 04:30 )             47.2                                               11-10    139  |  104  |  19  ----------------------------<  136<H>  4.6   |  23  |  0.7    Ca    9.9      10 Nov 2020 04:30  Mg     2.0     11-10                                                                                                                                      Culture - Blood (collected 08 Nov 2020 11:29)  Source: .Blood None  Preliminary Report (09 Nov 2020 18:02):    No growth to date.                                                                                       ABG - ( 09 Nov 2020 05:17 )  pH, Arterial: 7.40  pH, Blood: x     /  pCO2: 38    /  pO2: 74    / HCO3: 24    / Base Excess: -0.7  /  SaO2: 96                  MEDICATIONS  (STANDING):  albuterol/ipratropium for Nebulization 3 milliLiter(s) Nebulizer every 4 hours  chlorhexidine 4% Liquid 1 Application(s) Topical <User Schedule>  enoxaparin Injectable 40 milliGRAM(s) SubCutaneous at bedtime  methylPREDNISolone sodium succinate Injectable 60 milliGRAM(s) IV Push every 8 hours  pantoprazole    Tablet 40 milliGRAM(s) Oral before breakfast    MEDICATIONS  (PRN):  acetaminophen   Tablet .. 650 milliGRAM(s) Oral every 6 hours PRN Mild Pain (1 - 3)  ALBUTerol    90 MICROgram(s) HFA Inhaler 1 Puff(s) Inhalation every 4 hours PRN Shortness of Breath and/or Wheezing      New X-rays reviewed:                                                                                  ECHO    CXR interpreted by me:

## 2020-11-10 NOTE — PROGRESS NOTE ADULT - SUBJECTIVE AND OBJECTIVE BOX
DAILY PROGRESS NOTE ICU  ===========================================================    Patient Information:  LYDIA STARK  /  25y  /  Male  /  MRN#: [id number]    Hospital Day: 2d   Location: [facility]  A ([facility] ICU)    |:::::::::::::::::::::::::::| SUBJECTIVE |:::::::::::::::::::::::::::|    OVERNIGHT EVENTS:   TODAY: Patient was seen today at bedside. Review of systems is otherwise negative.    |:::::::::::::::::::::::::::| ADMISSION HPI |:::::::::::::::::::::::::::|    HPI:  25M w/ PMHx of Asthma presents w/ SOB and Wheezing started yesterday morning. Pt reports he woke up with feeling of chest-tightness, SOB and Wheezing. He used albuterol nebulizer throughout the day yesterday. Despite this his symptoms got worse with worsening chest-tightness and SOB so he decided to come to the hospital. He reports he had URTI recently on last Wednesday. He was unable to make an appointment to see his pulmonologist due to COVID. No past hospitalizations for Asthma exacerbations. Denies sick contacts. Admits to SOB/Wheezing and Chest tightness. Denies HA, fatigue, fever, chills, dizziness, change in vision, runny nose, sore throat, CP,cough, abd pain, nausea, vomiting, diarrhea, constipation, blood in stool or urine, and dysuria.      ED vitals: /90, , RR 20, T 97.8f, SPO2 94% on RA  EMS administered 125mg solumedrol, 1x duoneb  ED: received 2L LR, Mg infusion and duonebsx2 (08 Nov 2020 08:56)      HOME MEDICATIONS  albuterol 0.63 mg/3 mL (0.021%) inhalation solution: 3 milliliter(s) by nebulizer every 6 hours       |:::::::::::::::::::::::::::|  OBJECTIVE |:::::::::::::::::::::::::::|    STANDING MEDICATIONS  albuterol/ipratropium for Nebulization 3 milliLiter(s) Nebulizer every 4 hours  chlorhexidine 4% Liquid 1 Application(s) Topical <User Schedule>  enoxaparin Injectable 40 milliGRAM(s) SubCutaneous at bedtime  methylPREDNISolone sodium succinate Injectable 60 milliGRAM(s) IV Push every 8 hours  pantoprazole    Tablet 40 milliGRAM(s) Oral before breakfast    PRN MEDICATIONS  acetaminophen   Tablet .. 650 milliGRAM(s) Oral every 6 hours PRN  ALBUTerol    90 MICROgram(s) HFA Inhaler 1 Puff(s) Inhalation every 4 hours PRN    DRIPS      VITAL SIGNS: Last 24 Hours  T(C): 36.6 (09 Nov 2020 20:00), Max: 36.6 (09 Nov 2020 16:28)  T(F): 97.8 (09 Nov 2020 20:00), Max: 97.9 (09 Nov 2020 16:28)  HR: 82 (10 Nov 2020 04:00) (70 - 120)  BP: 110/62 (10 Nov 2020 03:00) (109/73 - 142/70)  BP(mean): 81 (10 Nov 2020 03:00) (79 - 100)  RR: 18 (10 Nov 2020 04:00) (17 - 29)  SpO2: 93% (10 Nov 2020 04:00) (92% - 100%)    Input-Output    11-08-20 @ 07:01  -  11-09-20 @ 07:00  --------------------------------------------------------  IN:    Oral Fluid: 500 mL  Total IN: 500 mL    OUT:    Voided (mL): 275 mL  Total OUT: 275 mL    Total NET: 225 mL      11-09-20 @ 07:01  -  11-10-20 @ 06:23  --------------------------------------------------------  IN:  Total IN: 0 mL    OUT:    Voided (mL): 750 mL  Total OUT: 750 mL    Total NET: -750 mL          Ventilator      PHYSICAL EXAM:  GEN: No acute distress.  LUNGS: Clear to auscultation bilaterally.  HEART: Regular rate and rhythm. No murmurs.  ABD: Soft, non-tender, non-distended.  EXT: Normal range of motion. No edema.  NEURO: AAOX3. Nonfocal.  PSYCH: Cooperative, appropriate.    LAB RESULTS:                        15.3   12.47 )-----------( 245      ( 10 Nov 2020 04:30 )             47.2     11-10    139  |  104  |  19  ----------------------------<  136<H>  4.6   |  23  |  0.7    Ca    9.9      10 Nov 2020 04:30  Mg     2.0     11-10          ABG - ( 09 Nov 2020 05:17 )  pH, Arterial: 7.40  pH, Blood: x     /  pCO2: 38    /  pO2: 74    / HCO3: 24    / Base Excess: -0.7  /  SaO2: 96                Lactate, Blood: 4.8 mmol/L <HH> [0.7 - 2.0] [TYPE:(C=Critical, N=Notification, A=Abnormal) C  TESTS: LACTATE_  DATE/TIME CALLED: 11/08/20 13:03_  CALLED TO: DR. DEL ROSARIO_  READ BACK (2 Patient Identifiers)(Y/N): Y_  READ BACK VALUES (Y/N): Y_  CALLED BY: SERJIO_] (11-08-20 @ 11:29)        MICROBIOLOGY:    Culture - Blood (collected 11-08-20 @ 11:29)  Source: .Blood None  Preliminary Report (11-09-20 @ 18:02):    No growth to date.      RADIOLOGY:    < from: Xray Chest 1 View- PORTABLE-Routine (Xray Chest 1 View- PORTABLE-Routine .) (11.08.20 @ 03:29) >  Impression:    No radiographic evidence of acute cardiopulmonary disease.  < end of copied text >      ALLERGIES:  No Known Allergies      RECENT ORDERS:  Xray Chest 1 View- PORTABLE-Routine: AM   Indication: asthma  Transport: Portable  Exam Completed (11-09-20 @ 17:41)  Magnesium, Serum: AM Sched. Collection: 10-Nov-2020 04:30 (11-09-20 @ 15:09)  Basic Metabolic Panel: AM Sched. Collection: 10-Nov-2020 04:30 (11-09-20 @ 15:09)  Complete Blood Count: AM Sched. Collection: 10-Nov-2020 04:30 (11-09-20 @ 15:09)  Magnesium, Serum: 16:00 (11-09-20 @ 11:25)  Procalcitonin, Serum: 16:00 (11-09-20 @ 11:25)  Respiratory Viral Panel with COVID-19 by JOHN: STAT (11-09-20 @ 11:25)  Diet, Regular:   Kosher (11-09-20 @ 08:20)      ===========================================================     DAILY PROGRESS NOTE ICU  ===========================================================    Patient Information:  LYDIA STARK  /  25y  /  Male  /  MRN#: [id number]    Hospital Day: 2d   Location: [facility]  A ([facility] ICU)    |:::::::::::::::::::::::::::| SUBJECTIVE |:::::::::::::::::::::::::::|    OVERNIGHT EVENTS: No acute events overnight.   TODAY: Patient was seen today at bedside. Feels his breathing is improved compared to yesterday. Review of systems is otherwise negative.    |:::::::::::::::::::::::::::| ADMISSION HPI |:::::::::::::::::::::::::::|    HPI:  25M w/ PMHx of Asthma presents w/ SOB and Wheezing started yesterday morning. Pt reports he woke up with feeling of chest-tightness, SOB and Wheezing. He used albuterol nebulizer throughout the day yesterday. Despite this his symptoms got worse with worsening chest-tightness and SOB so he decided to come to the hospital. He reports he had URTI recently on last Wednesday. He was unable to make an appointment to see his pulmonologist due to COVID. No past hospitalizations for Asthma exacerbations. Denies sick contacts. Admits to SOB/Wheezing and Chest tightness. Denies HA, fatigue, fever, chills, dizziness, change in vision, runny nose, sore throat, CP,cough, abd pain, nausea, vomiting, diarrhea, constipation, blood in stool or urine, and dysuria.      ED vitals: /90, , RR 20, T 97.8f, SPO2 94% on RA  EMS administered 125mg solumedrol, 1x duoneb  ED: received 2L LR, Mg infusion and duonebsx2 (08 Nov 2020 08:56)      HOME MEDICATIONS  albuterol 0.63 mg/3 mL (0.021%) inhalation solution: 3 milliliter(s) by nebulizer every 6 hours       |:::::::::::::::::::::::::::|  OBJECTIVE |:::::::::::::::::::::::::::|    STANDING MEDICATIONS  chlorhexidine 4% Liquid 1 Application(s) Topical <User Schedule>  enoxaparin Injectable 40 milliGRAM(s) SubCutaneous at bedtime  methylPREDNISolone sodium succinate Injectable 60 milliGRAM(s) IV Push every 12 hours  pantoprazole    Tablet 40 milliGRAM(s) Oral before breakfast    PRN MEDICATIONS  acetaminophen   Tablet .. 650 milliGRAM(s) Oral every 6 hours PRN  ALBUTerol    90 MICROgram(s) HFA Inhaler 1 Puff(s) Inhalation every 4 hours PRN  albuterol/ipratropium for Nebulization 3 milliLiter(s) Nebulizer every 4 hours PRN    DRIPS      VITAL SIGNS: Last 24 Hours  T(C): 36.2 (10 Nov 2020 12:00), Max: 36.6 (09 Nov 2020 16:28)  T(F): 97.2 (10 Nov 2020 12:00), Max: 97.9 (09 Nov 2020 16:28)  HR: 94 (10 Nov 2020 14:00) (70 - 112)  BP: 116/74 (10 Nov 2020 14:00) (110/62 - 142/70)  BP(mean): 88 (10 Nov 2020 14:00) (81 - 111)  RR: 28 (10 Nov 2020 14:00) (17 - 29)  SpO2: 94% (10 Nov 2020 14:00) (91% - 97%)    Input-Output    11-09-20 @ 07:01  -  11-10-20 @ 07:00  --------------------------------------------------------  IN:  Total IN: 0 mL    OUT:    Voided (mL): 750 mL  Total OUT: 750 mL    Total NET: -750 mL          Ventilator      PHYSICAL EXAM:  GEN: No acute distress.  LUNGS: Wheezing bilaterally all lung fields.  HEART: Regular rate and rhythm. No murmurs.  ABD: Soft, non-tender, non-distended.  EXT: Normal range of motion. No edema.  NEURO: AAOX3. Nonfocal.  PSYCH: Cooperative, appropriate.    LAB RESULTS:                        15.3   12.47 )-----------( 245      ( 10 Nov 2020 04:30 )             47.2     11-10    139  |  104  |  19  ----------------------------<  136<H>  4.6   |  23  |  0.7    Ca    9.9      10 Nov 2020 04:30  Mg     2.0     11-10          ABG - ( 09 Nov 2020 05:17 )  pH, Arterial: 7.40  pH, Blood: x     /  pCO2: 38    /  pO2: 74    / HCO3: 24    / Base Excess: -0.7  /  SaO2: 96                      MICROBIOLOGY:    Culture - Blood (collected 11-08-20 @ 11:29)  Source: .Blood None  Preliminary Report (11-09-20 @ 18:02):    No growth to date.      RADIOLOGY:    ALLERGIES:  No Known Allergies      RECENT ORDERS:  VA Duplex Lower Ext Vein Scan, Bilat: Routine   Indication: r/o DVT  Transport: Wheelchair (11-10-20 @ 12:08)  Xray Chest 1 View- PORTABLE-Routine: AM   Indication: asthma  Transport: Portable  Exam Completed (11-09-20 @ 17:41)  Magnesium, Serum: AM Sched. Collection: 10-Nov-2020 04:30 (11-09-20 @ 15:09)  Basic Metabolic Panel: AM Sched. Collection: 10-Nov-2020 04:30 (11-09-20 @ 15:09)  Complete Blood Count: AM Sched. Collection: 10-Nov-2020 04:30 (11-09-20 @ 15:09)      ===========================================================

## 2020-11-10 NOTE — PROGRESS NOTE ADULT - ASSESSMENT
INCOMPLETE  INCOMPLETE  INCOMPLETE 25M w/ PMHx of Asthma presents w/ SOB and wheezing starting the day before admission, admitted to ICU for severe asthma exacerbation.    # Acute asthma exacerbation 2/2 recent URTI  - No previous hospitalizations or intubations for asthma  - B/l expiratory wheezing on physical exam  - Keep Mg > 2  - Duoneb decreased to q4h PRN, solumedrol decreased from q8h to q12h  - On supplemental O2 via NC  - Needs o/p pulmonary f/u for long-term asthma management    # Tachycardia  - Sinus, likely 2/2 nebulizers  - Monitor VS    LE duplex also ordered to r/o DVT    DVT PPx: Lovenox 40 mg s/c  GI PPX: Protonix 40 mg PO  Diet: Regular Kosher  Activity: Increase as tolerated  Dispo: from home  Code Status: full code     Pending: Clinical improvement, tapering steroids, LE duplex, can eventually be D/c with close outpatient followup with pulmonology

## 2020-11-11 VITALS
SYSTOLIC BLOOD PRESSURE: 111 MMHG | RESPIRATION RATE: 19 BRPM | TEMPERATURE: 97 F | DIASTOLIC BLOOD PRESSURE: 70 MMHG | OXYGEN SATURATION: 94 % | HEART RATE: 95 BPM

## 2020-11-11 LAB
ANION GAP SERPL CALC-SCNC: 12 MMOL/L — SIGNIFICANT CHANGE UP (ref 7–14)
BUN SERPL-MCNC: 24 MG/DL — HIGH (ref 10–20)
CALCIUM SERPL-MCNC: 9.3 MG/DL — SIGNIFICANT CHANGE UP (ref 8.5–10.1)
CHLORIDE SERPL-SCNC: 103 MMOL/L — SIGNIFICANT CHANGE UP (ref 98–110)
CO2 SERPL-SCNC: 25 MMOL/L — SIGNIFICANT CHANGE UP (ref 17–32)
CREAT SERPL-MCNC: 0.7 MG/DL — SIGNIFICANT CHANGE UP (ref 0.7–1.5)
GLUCOSE SERPL-MCNC: 108 MG/DL — HIGH (ref 70–99)
HCT VFR BLD CALC: 47 % — SIGNIFICANT CHANGE UP (ref 42–52)
HGB BLD-MCNC: 15.4 G/DL — SIGNIFICANT CHANGE UP (ref 14–18)
MAGNESIUM SERPL-MCNC: 1.9 MG/DL — SIGNIFICANT CHANGE UP (ref 1.8–2.4)
MCHC RBC-ENTMCNC: 26.2 PG — LOW (ref 27–31)
MCHC RBC-ENTMCNC: 32.8 G/DL — SIGNIFICANT CHANGE UP (ref 32–37)
MCV RBC AUTO: 80.1 FL — SIGNIFICANT CHANGE UP (ref 80–94)
NRBC # BLD: 0 /100 WBCS — SIGNIFICANT CHANGE UP (ref 0–0)
PLATELET # BLD AUTO: 237 K/UL — SIGNIFICANT CHANGE UP (ref 130–400)
POTASSIUM SERPL-MCNC: 4.3 MMOL/L — SIGNIFICANT CHANGE UP (ref 3.5–5)
POTASSIUM SERPL-SCNC: 4.3 MMOL/L — SIGNIFICANT CHANGE UP (ref 3.5–5)
RBC # BLD: 5.87 M/UL — SIGNIFICANT CHANGE UP (ref 4.7–6.1)
RBC # FLD: 14.5 % — SIGNIFICANT CHANGE UP (ref 11.5–14.5)
SODIUM SERPL-SCNC: 140 MMOL/L — SIGNIFICANT CHANGE UP (ref 135–146)
WBC # BLD: 11.65 K/UL — HIGH (ref 4.8–10.8)
WBC # FLD AUTO: 11.65 K/UL — HIGH (ref 4.8–10.8)

## 2020-11-11 PROCEDURE — 99239 HOSP IP/OBS DSCHRG MGMT >30: CPT

## 2020-11-11 PROCEDURE — 93970 EXTREMITY STUDY: CPT | Mod: 26

## 2020-11-11 RX ORDER — IPRATROPIUM/ALBUTEROL SULFATE 18-103MCG
3 AEROSOL WITH ADAPTER (GRAM) INHALATION
Qty: 126 | Refills: 0
Start: 2020-11-11 | End: 2020-11-17

## 2020-11-11 RX ORDER — ALBUTEROL 90 UG/1
1 AEROSOL, METERED ORAL
Qty: 1 | Refills: 0
Start: 2020-11-11 | End: 2020-12-10

## 2020-11-11 RX ORDER — PANTOPRAZOLE SODIUM 20 MG/1
1 TABLET, DELAYED RELEASE ORAL
Qty: 7 | Refills: 0
Start: 2020-11-11 | End: 2020-11-17

## 2020-11-11 RX ORDER — BUDESONIDE AND FORMOTEROL FUMARATE DIHYDRATE 160; 4.5 UG/1; UG/1
2 AEROSOL RESPIRATORY (INHALATION)
Qty: 1 | Refills: 0
Start: 2020-11-11 | End: 2020-12-10

## 2020-11-11 RX ADMIN — CHLORHEXIDINE GLUCONATE 1 APPLICATION(S): 213 SOLUTION TOPICAL at 05:17

## 2020-11-11 RX ADMIN — BUDESONIDE AND FORMOTEROL FUMARATE DIHYDRATE 2 PUFF(S): 160; 4.5 AEROSOL RESPIRATORY (INHALATION) at 08:24

## 2020-11-11 RX ADMIN — Medication 60 MILLIGRAM(S): at 15:17

## 2020-11-11 RX ADMIN — Medication 60 MILLIGRAM(S): at 05:28

## 2020-11-11 RX ADMIN — PANTOPRAZOLE SODIUM 40 MILLIGRAM(S): 20 TABLET, DELAYED RELEASE ORAL at 05:20

## 2020-11-11 NOTE — DISCHARGE NOTE PROVIDER - PROVIDER TOKENS
PROVIDER:[TOKEN:[[id number]],FOLLOWUP:[2 weeks],ESTABLISHEDPATIENT:[T]],PROVIDER:[TOKEN:[[id number]],FOLLOWUP:[1-3 days],ESTABLISHEDPATIENT:[T]]

## 2020-11-11 NOTE — DISCHARGE NOTE PROVIDER - HOSPITAL COURSE
25M w/ PMHx of Asthma presents w/ SOB and Wheezing started yesterday morning. Pt reports he woke up with feeling of chest-tightness, SOB and Wheezing. He used albuterol nebulizer throughout the day yesterday. Despite this his symptoms got worse with worsening chest-tightness and SOB so he decided to come to the hospital. He reports he had URTI recently on last Wednesday. He was unable to make an appointment to see his pulmonologist due to COVID. No past hospitalizations for Asthma exacerbations. Denies sick contacts. Admits to SOB/Wheezing and Chest tightness. Denies HA, fatigue, fever, chills, dizziness, change in vision, runny nose, sore throat, CP,cough, abd pain, nausea, vomiting, diarrhea, constipation, blood in stool or urine, and dysuria.      ED vitals: /90, , RR 20, T 97.8f, SPO2 94% on RA  EMS administered 125mg solumedrol, 1x duoneb  ED: received 2L LR, Mg infusion and duonebsx2      # Acute asthma exacerbation 2/2 recent URTI  - No previous hospitalizations or intubations for asthma  - B/l expiratory wheezing on physical exam  - Keep Mg > 2  - C/w Duoneb q4h standing, solumedrol 80mg q12h; started symbicort   - ABG improving  - On RA currently  - Needs o/p pulmonary f/u for long-term asthma management  - RVP 11/9 positive for rhino/enterovirus    # Tachycardia  - Sinus, likely 2/2 nebulizers

## 2020-11-11 NOTE — PROGRESS NOTE ADULT - SUBJECTIVE AND OBJECTIVE BOX
LYDIA STARK 25y Male  MRN#: [id number]   CODE STATUS:________    SUBJECTIVE  Patient is a 25y old Male who presents with a chief complaint of SOB/Wheezing (10 Nov 2020 11:18)  Currently admitted to medicine with the primary diagnosis of Asthma exacerbation   Today is hospital day 3d, and this morning he is resting comfortably and reports no overnight events.       OBJECTIVE  PAST MEDICAL & SURGICAL HISTORY  Asthma    No significant past surgical history      ALLERGIES:  No Known Allergies    MEDICATIONS:  STANDING MEDICATIONS  budesonide  80 MICROgram(s)/formoterol 4.5 MICROgram(s) Inhaler 2 Puff(s) Inhalation two times a day  chlorhexidine 4% Liquid 1 Application(s) Topical <User Schedule>  enoxaparin Injectable 40 milliGRAM(s) SubCutaneous at bedtime  methylPREDNISolone sodium succinate Injectable 60 milliGRAM(s) IV Push every 12 hours  pantoprazole    Tablet 40 milliGRAM(s) Oral before breakfast    PRN MEDICATIONS  acetaminophen   Tablet .. 650 milliGRAM(s) Oral every 6 hours PRN  ALBUTerol    90 MICROgram(s) HFA Inhaler 1 Puff(s) Inhalation every 4 hours PRN  albuterol/ipratropium for Nebulization 3 milliLiter(s) Nebulizer every 4 hours PRN      VITAL SIGNS: Last 24 Hours  T(C): 35.6 (11 Nov 2020 05:45), Max: 36.6 (10 Nov 2020 19:37)  T(F): 96 (11 Nov 2020 05:45), Max: 97.9 (10 Nov 2020 19:37)  HR: 73 (11 Nov 2020 05:45) (72 - 98)  BP: 118/59 (11 Nov 2020 05:45) (114/79 - 128/78)  BP(mean): 93 (10 Nov 2020 18:00) (88 - 104)  RR: 20 (10 Nov 2020 22:30) (18 - 28)  SpO2: 93% (10 Nov 2020 22:30) (93% - 94%)    LABS:                        15.4   11.65 )-----------( 237      ( 11 Nov 2020 06:23 )             47.0     11-11    140  |  103  |  24<H>  ----------------------------<  108<H>  4.3   |  25  |  0.7    Ca    9.3      11 Nov 2020 06:23  Mg     1.9     11-11                Culture - Blood (collected 08 Nov 2020 11:29)  Source: .Blood None  Preliminary Report (09 Nov 2020 18:02):    No growth to date.          RADIOLOGY:    PHYSICAL EXAM:    GENERAL: NAD, well-developed, AAOx3  HEENT:  Atraumatic, Normocephalic. EOMI,   PULMONARY: Clear to auscultation bilaterally; expiratory wheezes heard b/l   CARDIOVASCULAR: Regular rate and rhythm; No murmurs  GASTROINTESTINAL: Soft, Nontender, Nondistended; Bowel sounds present  MUSCULOSKELETAL:  No clubbing, cyanosis, or edema  NEUROLOGY: non-focal  SKIN: No rashes or lesions      ASSESSMENT & PLAN  25M w/ PMHx of Asthma presents w/ SOB and wheezing starting the day before admission, admitted to ICU for severe asthma exacerbation.    # Acute asthma exacerbation 2/2 recent URTI  - No previous hospitalizations or intubations for asthma  - B/l expiratory wheezing on physical exam  - Keep Mg > 2  - C/w Duoneb q4h standing, solumedrol 80mg q12h; started symbicort   - ABG improving  - On RA currently  - Needs o/p pulmonary f/u for long-term asthma management  - RVP 11/9 positive for rhino/enterovirus    # Tachycardia  - Sinus, likely 2/2 nebulizers  - Monitor VS    DVT PPx: Lovenox 40 mg s/c  GI PPX: Protonix 40 mg PO  Diet: Regular Kosher  Activity: Increase as tolerated  Dispo: from home, will d/c home to follow with pulm- will discharge once d/w with attending  Code Status: full code    LYDIA STARK 25y Male  MRN#: [id number]   CODE STATUS:________    SUBJECTIVE  Patient is a 25y old Male who presents with a chief complaint of SOB/Wheezing (10 Nov 2020 11:18)  Currently admitted to medicine with the primary diagnosis of Asthma exacerbation   Today is hospital day 3d, and this morning he is resting comfortably and reports no overnight events.       OBJECTIVE  PAST MEDICAL & SURGICAL HISTORY  Asthma    No significant past surgical history      ALLERGIES:  No Known Allergies    MEDICATIONS:  STANDING MEDICATIONS  budesonide  80 MICROgram(s)/formoterol 4.5 MICROgram(s) Inhaler 2 Puff(s) Inhalation two times a day  chlorhexidine 4% Liquid 1 Application(s) Topical <User Schedule>  enoxaparin Injectable 40 milliGRAM(s) SubCutaneous at bedtime  methylPREDNISolone sodium succinate Injectable 60 milliGRAM(s) IV Push every 12 hours  pantoprazole    Tablet 40 milliGRAM(s) Oral before breakfast    PRN MEDICATIONS  acetaminophen   Tablet .. 650 milliGRAM(s) Oral every 6 hours PRN  ALBUTerol    90 MICROgram(s) HFA Inhaler 1 Puff(s) Inhalation every 4 hours PRN  albuterol/ipratropium for Nebulization 3 milliLiter(s) Nebulizer every 4 hours PRN      VITAL SIGNS: Last 24 Hours  T(C): 35.6 (11 Nov 2020 05:45), Max: 36.6 (10 Nov 2020 19:37)  T(F): 96 (11 Nov 2020 05:45), Max: 97.9 (10 Nov 2020 19:37)  HR: 73 (11 Nov 2020 05:45) (72 - 98)  BP: 118/59 (11 Nov 2020 05:45) (114/79 - 128/78)  BP(mean): 93 (10 Nov 2020 18:00) (88 - 104)  RR: 20 (10 Nov 2020 22:30) (18 - 28)  SpO2: 93% (10 Nov 2020 22:30) (93% - 94%)    LABS:                        15.4   11.65 )-----------( 237      ( 11 Nov 2020 06:23 )             47.0     11-11    140  |  103  |  24<H>  ----------------------------<  108<H>  4.3   |  25  |  0.7    Ca    9.3      11 Nov 2020 06:23  Mg     1.9     11-11                Culture - Blood (collected 08 Nov 2020 11:29)  Source: .Blood None  Preliminary Report (09 Nov 2020 18:02):    No growth to date.          RADIOLOGY:    PHYSICAL EXAM:    GENERAL: NAD, well-developed, AAOx3  HEENT:  Atraumatic, Normocephalic. EOMI,   PULMONARY: Clear to auscultation bilaterally; expiratory wheezes heard b/l   CARDIOVASCULAR: Regular rate and rhythm; No murmurs  GASTROINTESTINAL: Soft, Nontender, Nondistended; Bowel sounds present  MUSCULOSKELETAL:  No clubbing, cyanosis, or edema  NEUROLOGY: non-focal  SKIN: No rashes or lesions      ASSESSMENT & PLAN  25M w/ PMHx of Asthma presents w/ SOB and wheezing starting the day before admission, admitted to ICU for severe asthma exacerbation.    # Acute asthma exacerbation 2/2 recent URTI  - No previous hospitalizations or intubations for asthma  - B/l expiratory wheezing on physical exam  - Keep Mg > 2  - C/w Duoneb q4h standing, solumedrol 80mg q12h; started symbicort   - ABG improving  - On RA currently  - Needs o/p pulmonary f/u for long-term asthma management  - RVP 11/9 positive for rhino/enterovirus    # Tachycardia  - Sinus, likely 2/2 nebulizers  - Monitor VS    DVT PPx: Lovenox 40 mg s/c  GI PPX: Protonix 40 mg PO  Diet: Regular Kosher  Activity: Increase as tolerated  Dispo: from home, will d/c home to follow with pulm- will discharge once d/w with attending  Code Status: full code     Attending Attestation:     Pt has been seen and examined. Case and Plan discussed at rounds.   Pt states he wants to go home today and is feeling well enough.  He wants to f/u with his [facility] Pulmonologist and PMD.  Will give Pulmonary Clinic Referral incase he wants to f/u.     Asthma Exarc 2/2 RHINO/ENTEROvirus   - s/p ICU stay  - Give Solumedrol 60mg IV x1 now before d/c (s/p 60mg IV in am)  - Albuterol MDI  - Symbicort   - Protonix 20mg po daily x 8days while on prednisone  - f/u Pulmonary clinic  - Recommended Flue Vaccine before d/c pt is thinking about it     Dispo: d/c home

## 2020-11-11 NOTE — DISCHARGE NOTE NURSING/CASE MANAGEMENT/SOCIAL WORK - PATIENT PORTAL LINK FT
You can access the FollowMyHealth Patient Portal offered by [facility] by registering at the following website: [email or web address]. By joining [employer]’s FollowMyHealth portal, you will also be able to view your health information using other applications (apps) compatible with our system.

## 2020-11-11 NOTE — DISCHARGE NOTE PROVIDER - CARE PROVIDER_API CALL
Samia Holdingford  [id number]  [address] [place] [address]  Phone: [phone]  Fax: [phone]  Established Patient  Follow Up Time: 2 weeks    Sergio Rivera  INTERNAL MEDICINE  [address] [place] [address], [place] [address]  Phone: [phone]  Fax: [phone]  Established Patient  Follow Up Time: 1-3 days

## 2020-11-11 NOTE — DISCHARGE NOTE NURSING/CASE MANAGEMENT/SOCIAL WORK - NSDCPNDISPN_GEN_ALL_CORE
Activities of daily living, including home environment that might     exacerbate pain or reduce effectiveness of the pain management plan of care as well as strategies to address these issues/Opioids not applicable/not prescribed/Education provided on the pain management plan of care/Side effects of pain management treatment/Safe use, storage and disposal of opioids when prescribed

## 2020-11-11 NOTE — DISCHARGE NOTE PROVIDER - NSDCCPCAREPLAN_GEN_ALL_CORE_FT
PRINCIPAL DISCHARGE DIAGNOSIS  Diagnosis: Asthma exacerbation  Assessment and Plan of Treatment: Due to your respiratory infection, you had an asthma exacerbation. You were treated with inhalers and IV steroids and admitted to ICU. Once stable you were downgraded to medicine and were continued with same management. You still are wheezing and you are given a dose of IV steroids prior to discharge. We recommend you follow up with a pulmonologist as soon as possible. We recommend a flu vaccine as well due to respiratory infections exacerbate asthma. You will be discharged on oral steroids and inhalers       PRINCIPAL DISCHARGE DIAGNOSIS  Diagnosis: Asthma exacerbation  Assessment and Plan of Treatment: Due to your respiratory infection, you had an asthma exacerbation. You were treated with inhalers and IV steroids and admitted to ICU. Once stable you were downgraded to medicine and were continued with same management. You still are wheezing and you are given a dose of IV steroids prior to discharge. We recommend you follow up with a pulmonologist as soon as possible. We recommend a flu vaccine as well due to respiratory infections exacerbate asthma. You will be discharged on oral steroids and inhalers  Asthma exarc 2/2 Rhino/Enteric URI

## 2020-11-12 RX ORDER — PREDNISOLONE 5 MG
4 TABLET ORAL
Qty: 16 | Refills: 0
Start: 2020-11-12 | End: 2020-11-15

## 2020-11-13 LAB
CULTURE RESULTS: SIGNIFICANT CHANGE UP
SPECIMEN SOURCE: SIGNIFICANT CHANGE UP

## 2020-11-16 DIAGNOSIS — Z87.891 PERSONAL HISTORY OF NICOTINE DEPENDENCE: ICD-10-CM

## 2020-11-16 DIAGNOSIS — R00.0 TACHYCARDIA, UNSPECIFIED: ICD-10-CM

## 2020-11-16 DIAGNOSIS — J45.901 UNSPECIFIED ASTHMA WITH (ACUTE) EXACERBATION: ICD-10-CM

## 2020-11-16 DIAGNOSIS — E87.2 ACIDOSIS: ICD-10-CM

## 2020-11-16 DIAGNOSIS — B34.1 ENTEROVIRUS INFECTION, UNSPECIFIED: ICD-10-CM

## 2020-11-16 DIAGNOSIS — J96.02 ACUTE RESPIRATORY FAILURE WITH HYPERCAPNIA: ICD-10-CM

## 2020-11-16 DIAGNOSIS — T38.0X5A ADVERSE EFFECT OF GLUCOCORTICOIDS AND SYNTHETIC ANALOGUES, INITIAL ENCOUNTER: ICD-10-CM

## 2020-11-16 DIAGNOSIS — T50.995A ADVERSE EFFECT OF OTHER DRUGS, MEDICAMENTS AND BIOLOGICAL SUBSTANCES, INITIAL ENCOUNTER: ICD-10-CM

## 2020-11-16 RX ORDER — PREDNISOLONE 5 MG
2 TABLET ORAL
Qty: 8 | Refills: 0
Start: 2020-11-16 | End: 2020-11-19

## 2021-09-22 ENCOUNTER — EMERGENCY (EMERGENCY)
Facility: HOSPITAL | Age: 26
LOS: 0 days | Discharge: LEFT BEFORE DISCHARGE | End: 2021-09-22
Attending: EMERGENCY MEDICINE | Admitting: EMERGENCY MEDICINE
Payer: MEDICAID

## 2021-09-22 ENCOUNTER — EMERGENCY (EMERGENCY)
Facility: HOSPITAL | Age: 26
LOS: 0 days | Discharge: HOME | End: 2021-09-22
Attending: EMERGENCY MEDICINE | Admitting: EMERGENCY MEDICINE
Payer: MEDICAID

## 2021-09-22 VITALS
SYSTOLIC BLOOD PRESSURE: 137 MMHG | DIASTOLIC BLOOD PRESSURE: 81 MMHG | HEIGHT: 67 IN | TEMPERATURE: 98 F | RESPIRATION RATE: 20 BRPM | WEIGHT: 160.06 LBS | OXYGEN SATURATION: 96 % | HEART RATE: 95 BPM

## 2021-09-22 VITALS
SYSTOLIC BLOOD PRESSURE: 140 MMHG | TEMPERATURE: 99 F | DIASTOLIC BLOOD PRESSURE: 85 MMHG | OXYGEN SATURATION: 93 % | HEIGHT: 67 IN | WEIGHT: 160.06 LBS | RESPIRATION RATE: 24 BRPM | HEART RATE: 125 BPM

## 2021-09-22 DIAGNOSIS — Z20.822 CONTACT WITH AND (SUSPECTED) EXPOSURE TO COVID-19: ICD-10-CM

## 2021-09-22 DIAGNOSIS — R07.9 CHEST PAIN, UNSPECIFIED: ICD-10-CM

## 2021-09-22 DIAGNOSIS — J45.901 UNSPECIFIED ASTHMA WITH (ACUTE) EXACERBATION: ICD-10-CM

## 2021-09-22 DIAGNOSIS — R06.2 WHEEZING: ICD-10-CM

## 2021-09-22 DIAGNOSIS — R06.02 SHORTNESS OF BREATH: ICD-10-CM

## 2021-09-22 LAB
ALBUMIN SERPL ELPH-MCNC: 4.7 G/DL — SIGNIFICANT CHANGE UP (ref 3.5–5.2)
ALP SERPL-CCNC: 65 U/L — SIGNIFICANT CHANGE UP (ref 30–115)
ALT FLD-CCNC: 23 U/L — SIGNIFICANT CHANGE UP (ref 0–41)
ANION GAP SERPL CALC-SCNC: 12 MMOL/L — SIGNIFICANT CHANGE UP (ref 7–14)
AST SERPL-CCNC: 16 U/L — SIGNIFICANT CHANGE UP (ref 0–41)
BASE EXCESS BLDV CALC-SCNC: -0.8 MMOL/L — SIGNIFICANT CHANGE UP (ref -2–3)
BASOPHILS # BLD AUTO: 0.02 K/UL — SIGNIFICANT CHANGE UP (ref 0–0.2)
BASOPHILS NFR BLD AUTO: 0.2 % — SIGNIFICANT CHANGE UP (ref 0–1)
BILIRUB SERPL-MCNC: 0.3 MG/DL — SIGNIFICANT CHANGE UP (ref 0.2–1.2)
BUN SERPL-MCNC: 10 MG/DL — SIGNIFICANT CHANGE UP (ref 10–20)
CA-I SERPL-SCNC: 1.23 MMOL/L — SIGNIFICANT CHANGE UP (ref 1.15–1.33)
CALCIUM SERPL-MCNC: 9.2 MG/DL — SIGNIFICANT CHANGE UP (ref 8.5–10.1)
CHLORIDE SERPL-SCNC: 106 MMOL/L — SIGNIFICANT CHANGE UP (ref 98–110)
CO2 SERPL-SCNC: 22 MMOL/L — SIGNIFICANT CHANGE UP (ref 17–32)
CREAT SERPL-MCNC: 0.6 MG/DL — LOW (ref 0.7–1.5)
EOSINOPHIL # BLD AUTO: 0.01 K/UL — SIGNIFICANT CHANGE UP (ref 0–0.7)
EOSINOPHIL NFR BLD AUTO: 0.1 % — SIGNIFICANT CHANGE UP (ref 0–8)
GAS PNL BLDV: 137 MMOL/L — SIGNIFICANT CHANGE UP (ref 136–145)
GAS PNL BLDV: SIGNIFICANT CHANGE UP
GLUCOSE SERPL-MCNC: 125 MG/DL — HIGH (ref 70–99)
HCO3 BLDV-SCNC: 26 MMOL/L — SIGNIFICANT CHANGE UP (ref 22–29)
HCT VFR BLD CALC: 44.8 % — SIGNIFICANT CHANGE UP (ref 42–52)
HCT VFR BLDA CALC: 46 % — SIGNIFICANT CHANGE UP (ref 39–51)
HGB BLD CALC-MCNC: 15.2 G/DL — SIGNIFICANT CHANGE UP (ref 12.6–17.4)
HGB BLD-MCNC: 14.8 G/DL — SIGNIFICANT CHANGE UP (ref 14–18)
IMM GRANULOCYTES NFR BLD AUTO: 0.5 % — HIGH (ref 0.1–0.3)
LACTATE BLDV-MCNC: 2.2 MMOL/L — HIGH (ref 0.5–2)
LYMPHOCYTES # BLD AUTO: 0.53 K/UL — LOW (ref 1.2–3.4)
LYMPHOCYTES # BLD AUTO: 4 % — LOW (ref 20.5–51.1)
MAGNESIUM SERPL-MCNC: 1.6 MG/DL — LOW (ref 1.8–2.4)
MCHC RBC-ENTMCNC: 26.5 PG — LOW (ref 27–31)
MCHC RBC-ENTMCNC: 33 G/DL — SIGNIFICANT CHANGE UP (ref 32–37)
MCV RBC AUTO: 80.1 FL — SIGNIFICANT CHANGE UP (ref 80–94)
MONOCYTES # BLD AUTO: 0.8 K/UL — HIGH (ref 0.1–0.6)
MONOCYTES NFR BLD AUTO: 6.1 % — SIGNIFICANT CHANGE UP (ref 1.7–9.3)
NEUTROPHILS # BLD AUTO: 11.73 K/UL — HIGH (ref 1.4–6.5)
NEUTROPHILS NFR BLD AUTO: 89.1 % — HIGH (ref 42.2–75.2)
NRBC # BLD: 0 /100 WBCS — SIGNIFICANT CHANGE UP (ref 0–0)
NT-PROBNP SERPL-SCNC: 408 PG/ML — HIGH (ref 0–300)
PCO2 BLDV: 49 MMHG — SIGNIFICANT CHANGE UP (ref 42–55)
PH BLDV: 7.33 — SIGNIFICANT CHANGE UP (ref 7.32–7.43)
PLATELET # BLD AUTO: 198 K/UL — SIGNIFICANT CHANGE UP (ref 130–400)
PO2 BLDV: 38 MMHG — SIGNIFICANT CHANGE UP
POTASSIUM BLDV-SCNC: 4.1 MMOL/L — SIGNIFICANT CHANGE UP (ref 3.5–5.1)
POTASSIUM SERPL-MCNC: 3.9 MMOL/L — SIGNIFICANT CHANGE UP (ref 3.5–5)
POTASSIUM SERPL-SCNC: 3.9 MMOL/L — SIGNIFICANT CHANGE UP (ref 3.5–5)
PROT SERPL-MCNC: 7.2 G/DL — SIGNIFICANT CHANGE UP (ref 6–8)
RBC # BLD: 5.59 M/UL — SIGNIFICANT CHANGE UP (ref 4.7–6.1)
RBC # FLD: 14.8 % — HIGH (ref 11.5–14.5)
SAO2 % BLDV: 66.1 % — SIGNIFICANT CHANGE UP
SARS-COV-2 RNA SPEC QL NAA+PROBE: SIGNIFICANT CHANGE UP
SODIUM SERPL-SCNC: 140 MMOL/L — SIGNIFICANT CHANGE UP (ref 135–146)
TROPONIN T SERPL-MCNC: <0.01 NG/ML — SIGNIFICANT CHANGE UP
WBC # BLD: 13.15 K/UL — HIGH (ref 4.8–10.8)
WBC # FLD AUTO: 13.15 K/UL — HIGH (ref 4.8–10.8)

## 2021-09-22 PROCEDURE — 99284 EMERGENCY DEPT VISIT MOD MDM: CPT

## 2021-09-22 PROCEDURE — 71045 X-RAY EXAM CHEST 1 VIEW: CPT | Mod: 26

## 2021-09-22 PROCEDURE — 99285 EMERGENCY DEPT VISIT HI MDM: CPT

## 2021-09-22 RX ORDER — ALBUTEROL 90 UG/1
3 AEROSOL, METERED ORAL
Qty: 90 | Refills: 0
Start: 2021-09-22 | End: 2021-09-26

## 2021-09-22 RX ORDER — IPRATROPIUM/ALBUTEROL SULFATE 18-103MCG
3 AEROSOL WITH ADAPTER (GRAM) INHALATION ONCE
Refills: 0 | Status: COMPLETED | OUTPATIENT
Start: 2021-09-22 | End: 2021-09-22

## 2021-09-22 RX ORDER — MAGNESIUM SULFATE 500 MG/ML
2 VIAL (ML) INJECTION ONCE
Refills: 0 | Status: COMPLETED | OUTPATIENT
Start: 2021-09-22 | End: 2021-09-22

## 2021-09-22 RX ORDER — IPRATROPIUM/ALBUTEROL SULFATE 18-103MCG
3 AEROSOL WITH ADAPTER (GRAM) INHALATION
Refills: 0 | Status: COMPLETED | OUTPATIENT
Start: 2021-09-22 | End: 2021-09-22

## 2021-09-22 RX ORDER — ALBUTEROL 90 UG/1
2 AEROSOL, METERED ORAL
Qty: 1 | Refills: 0
Start: 2021-09-22 | End: 2021-10-21

## 2021-09-22 RX ORDER — IPRATROPIUM/ALBUTEROL SULFATE 18-103MCG
3 AEROSOL WITH ADAPTER (GRAM) INHALATION
Qty: 126 | Refills: 0
Start: 2021-09-22 | End: 2021-09-28

## 2021-09-22 RX ORDER — SODIUM CHLORIDE 9 MG/ML
1000 INJECTION, SOLUTION INTRAVENOUS ONCE
Refills: 0 | Status: COMPLETED | OUTPATIENT
Start: 2021-09-22 | End: 2021-09-22

## 2021-09-22 RX ORDER — ALBUTEROL 90 UG/1
2.5 AEROSOL, METERED ORAL
Refills: 0 | Status: COMPLETED | OUTPATIENT
Start: 2021-09-22 | End: 2021-09-22

## 2021-09-22 RX ADMIN — Medication 3 MILLILITER(S): at 14:45

## 2021-09-22 RX ADMIN — Medication 3 MILLILITER(S): at 01:35

## 2021-09-22 RX ADMIN — Medication 50 GRAM(S): at 15:31

## 2021-09-22 RX ADMIN — Medication 3 MILLILITER(S): at 15:05

## 2021-09-22 RX ADMIN — ALBUTEROL 2.5 MILLIGRAM(S): 90 AEROSOL, METERED ORAL at 16:07

## 2021-09-22 RX ADMIN — Medication 50 GRAM(S): at 01:36

## 2021-09-22 RX ADMIN — Medication 3 MILLILITER(S): at 15:30

## 2021-09-22 RX ADMIN — Medication 3 MILLILITER(S): at 03:26

## 2021-09-22 RX ADMIN — SODIUM CHLORIDE 1000 MILLILITER(S): 9 INJECTION, SOLUTION INTRAVENOUS at 01:35

## 2021-09-22 RX ADMIN — Medication 3 MILLILITER(S): at 03:27

## 2021-09-22 NOTE — ED ADULT NURSE NOTE - OBJECTIVE STATEMENT
Pt reports a cold for 2 days, followed by asthma exacerbation. Pt denies fevers, dizziness, not sob at current time. Pt AO4, Pt reports albuterol taken at home did not help. On assessment pt reportedly feeling "much better". On O2 mask with wheezing auscultated throughout lung fields. Pt reports wheezing has improved.

## 2021-09-22 NOTE — ED PROVIDER NOTE - CLINICAL SUMMARY MEDICAL DECISION MAKING FREE TEXT BOX
25 yo M, hx of asthma, generally well controlled, previous admissions but never intubated or in ICU, usually triggered by URI, here for assessment of wheezing in setting of recent congestion, cough. No fever, chills, CP. Used albuterol via neb at home and en route. Received solumedrol en route.     On arrival, tachypneic, not hypoxic, diffuse wheezing, prolonged expiration.    Received combivent x 3, albuterol alone x 3, mag, fluids. Peak flow only improved from 200 to 250 and still has continued diffuse wheezing, though subjectively feels much better.     Given continued wheezing and poor peak flow, advised on admission.     The patient states he often feels like this after treatment and feels like he is well enough to go home.     Patient will leave the ED AMA. Discussed return precautions, need for continued steroid use and albuterol, risks of leaving AMA.     Patient able to verbalize risks, reasons for AMA discharge and return precautions.

## 2021-09-22 NOTE — ED ADULT NURSE REASSESSMENT NOTE - NS ED NURSE REASSESS COMMENT FT1
multiple rn and MD educated pt on importance of staying for treatments, pt adamant about leaving , pt states " I will do treatments at home and if I need to come back than I will"

## 2021-09-22 NOTE — ED PROVIDER NOTE - CLINICAL SUMMARY MEDICAL DECISION MAKING FREE TEXT BOX
27 yo M presented to Ed for SOB due to asthma exacerbation. Pt was in respiratory distress and had already received steroids and requires admission.  pt refused. Discussed risks with patient including death and pt understands but wants to leave anyway. Pt signed out AMA

## 2021-09-22 NOTE — ED PROVIDER NOTE - PATIENT PORTAL LINK FT
You can access the FollowMyHealth Patient Portal offered by [facility] by registering at the following website: [email or web address]. By joining [employer]’s FollowMyHealth portal, you will also be able to view your health information using other applications (apps) compatible with our system.

## 2021-09-22 NOTE — ED ADULT TRIAGE NOTE - CHIEF COMPLAINT QUOTE
Asthma exacerbation x 1 day, was seen yesterday for the same issue. Noted accessary muscle use, is able to speak in full sentences. Patient received 2 neb tx and Dexamethasone 12 mg IV.

## 2021-09-22 NOTE — ED PROVIDER NOTE - OBJECTIVE STATEMENT
26M hx asthma presents with chest pain and shortness of breath that started this morning. pain is intermittent, nonexertional, no longer present currently, patient was seen in ED yesterday for asthma exacerbation and given IV medications including steroids. patient denies syncope/vomiting/fever/diarrhea/abd pain/back pain.

## 2021-09-22 NOTE — ED PROVIDER NOTE - PROVIDER TOKENS
PROVIDER:[TOKEN:[[id number]],FOLLOWUP:[4-6 Days]],PROVIDER:[TOKEN:[[id number]],FOLLOWUP:[4-6 Days]]

## 2021-09-22 NOTE — ED PROVIDER NOTE - ATTENDING CONTRIBUTION TO CARE
27 yo M with PMH of asthma (hospitalized no intubations) presents to ED for SOB. Pt was seen in the ED yesterday for asthma exacerbation and given steroids and breathing treatements. Pt went home and took steroids this morning and breathing treatments but was feeling more SOB so called 911. EMS gave pt one duoneb treatment and decadron 12mg IV.   Pt not vaccinated against COVID.   No fevers, chills.     Const: respiratory distress   Eyes: PERRL, no conjunctival injection  HENT:  Neck supple without meningismus   CV: RRR, Warm, well-perfused extremities  RESP: diffuse inspiratory or expiratory wheezes, + tachypnea   GI: soft, non-tender, non-distended  MSK: No gross deformities appreciated  Skin: Warm, dry. No rashes  Neuro: Alert, CNs II-XII grossly intact. Sensation and motor function of extremities grossly intact.  Psych: Appropriate mood and affect.      labs, xray, magnesium

## 2021-09-22 NOTE — ED ADULT TRIAGE NOTE - CHIEF COMPLAINT QUOTE
pt biba for asthma exacerbation. pt was given two combivent nebulizers and solumedrol 125mg IV by EMS with improvement.

## 2021-09-22 NOTE — ED PROVIDER NOTE - OBJECTIVE STATEMENT
25 yo M with PMHx of asthma (previous admissions, never intubated) presents to the ED c/o SOB and wheezing typical of his asthma exacerbations. Pt states he recently had URI and thinks this is what triggered his exacerbation. Pt tried using his inhaler but symptoms did not improve so he called EMS. EMS gave pt few nebulizer treatments and 125mg of solu-medrol with mild improvement in symptoms. Pt denies fever, chills, nausea, vomiting, abdominal pain, diarrhea, headache, dizziness, weakness, chest pain, back pain, LOC, trauma, urinary symptoms, cough, calf pain/swelling, recent travel, recent surgery.

## 2021-09-22 NOTE — ED PROVIDER NOTE - PHYSICAL EXAMINATION
Vital Signs: I have reviewed the initial vital signs.  Constitutional: well-nourished, appears stated age, no acute distress.  HEENT: Airway patent, moist MM, no erythema/swelling/deformity of oral structures. EOMI, PERRLA.  CV: regular rate, regular rhythm, well-perfused extremities, 2+ b/l DP and radial pulses equal.  Lungs: bilateral wheezing, mild increased WOB.  ABD: NTND, no guarding or rebound, no pulsatile mass, no hernias, no flank pain.   MSK: Neck supple, nontender, nl ROM, no stepoff. Chest nontender. Back nontender in TLS spine or to b/l bony structures. Ext nontender, nl rom, no deformity.   INTEG: Skin warm, dry, no rash.  NEURO: A&Ox3, moving all extremities, normal speech  PSYCH: Calm, cooperative, normal affect and interaction.

## 2021-09-22 NOTE — ED PROVIDER NOTE - NS ED ROS FT
Review of Systems  Constitutional:  No fever, chills.  Eyes:  No visual changes, eye pain, or discharge.  ENMT:  No hearing changes, pain, or discharge. No nasal congestion, discharge, or bleeding. No throat pain, swelling, or difficulty swallowing.  Cardiac:  No chest pain, palpitations, syncope, or edema.  Respiratory:  No cough. No hemoptysis. (+) SOB  GI:  No nausea, vomiting, diarrhea, or abdominal pain.   MS:  No back pain.  Skin:  No skin rash, pruritis, jaundice, or lesions.  Neuro:  No headache, dizziness, loss of sensation, or focal weakness.  No change in mental status.

## 2021-09-22 NOTE — ED PROVIDER NOTE - PROGRESS NOTE DETAILS
patient notes that he feels better, does not want to wait for labs or admission. I had extensive discussion of risks and benefits of pursuing further medical evaluation and/or care with patient and any available family/friends; patient still electing to leave against medical advice. Patient is awake, alert, oriented and demonstrates full capacity and insight into illness. Patient aware and encouraged to return immediately to ED or nearest ED if patient decides to change mind regarding care or if patient experiences any new, worsening, or concerning symptoms.

## 2021-09-22 NOTE — ED PROVIDER NOTE - PHYSICAL EXAMINATION
VITAL SIGNS: I have reviewed nursing notes and confirm.  CONSTITUTIONAL: Well-developed; well-nourished; in no acute distress.  SKIN: Skin exam is warm and dry, no acute rash.  HEAD: Normocephalic; atraumatic.  EYES: Conjunctiva and sclera clear.  ENT: No nasal discharge; airway clear.  CARD: S1, S2 normal; no murmurs, gallops, or rubs. Regular rate and rhythm.  RESP: (+) diffuse wheezing  EXT: Normal ROM. No clubbing, cyanosis or edema.  NEURO: Alert, oriented. Grossly unremarkable. No focal deficits.

## 2021-09-22 NOTE — ED PROVIDER NOTE - CARE PROVIDER_API CALL
Malachi Greene)  Critical Care Medicine; Pulmonary Disease; Sleep Medicine  [address] [place] [address]  Phone: [phone]  Fax: [phone]  Follow Up Time: 4-6 Days    Sam Rae)  Critical Care Medicine; Internal Medicine; Pulmonary Disease; Sleep Medicine  [address] [place] [address]  Phone: [phone]  Fax: [phone]  Follow Up Time: 4-6 Days

## 2021-09-22 NOTE — ED PROVIDER NOTE - PATIENT PORTAL LINK FT
You can access the FollowMyHealth Patient Portal offered by [facility] by registering at the following website: [email or web address]. By joining [employer]’s FollowMyHealth portal, you will also be able to view your health information using other applications (apps) compatible with our system.